# Patient Record
Sex: FEMALE | Race: OTHER | HISPANIC OR LATINO | URBAN - METROPOLITAN AREA
[De-identification: names, ages, dates, MRNs, and addresses within clinical notes are randomized per-mention and may not be internally consistent; named-entity substitution may affect disease eponyms.]

---

## 2017-08-18 ENCOUNTER — OUTPATIENT (OUTPATIENT)
Dept: OUTPATIENT SERVICES | Facility: HOSPITAL | Age: 82
LOS: 1 days | Discharge: HOME | End: 2017-08-18

## 2017-08-18 DIAGNOSIS — E08.9 DIABETES MELLITUS DUE TO UNDERLYING CONDITION WITHOUT COMPLICATIONS: ICD-10-CM

## 2017-08-18 DIAGNOSIS — K75.2 NONSPECIFIC REACTIVE HEPATITIS: ICD-10-CM

## 2017-08-18 DIAGNOSIS — I10 ESSENTIAL (PRIMARY) HYPERTENSION: ICD-10-CM

## 2017-08-19 ENCOUNTER — OUTPATIENT (OUTPATIENT)
Dept: OUTPATIENT SERVICES | Facility: HOSPITAL | Age: 82
LOS: 1 days | Discharge: HOME | End: 2017-08-19

## 2017-08-19 DIAGNOSIS — I10 ESSENTIAL (PRIMARY) HYPERTENSION: ICD-10-CM

## 2017-09-07 ENCOUNTER — OUTPATIENT (OUTPATIENT)
Dept: OUTPATIENT SERVICES | Facility: HOSPITAL | Age: 82
LOS: 1 days | Discharge: HOME | End: 2017-09-07

## 2017-09-07 DIAGNOSIS — I10 ESSENTIAL (PRIMARY) HYPERTENSION: ICD-10-CM

## 2018-09-28 ENCOUNTER — OUTPATIENT (OUTPATIENT)
Dept: OUTPATIENT SERVICES | Facility: HOSPITAL | Age: 83
LOS: 1 days | Discharge: HOME | End: 2018-09-28

## 2018-09-28 DIAGNOSIS — R19.7 DIARRHEA, UNSPECIFIED: ICD-10-CM

## 2018-10-01 ENCOUNTER — EMERGENCY (EMERGENCY)
Facility: HOSPITAL | Age: 83
LOS: 0 days | Discharge: HOME | End: 2018-10-01
Attending: EMERGENCY MEDICINE | Admitting: EMERGENCY MEDICINE

## 2018-10-01 VITALS
SYSTOLIC BLOOD PRESSURE: 106 MMHG | DIASTOLIC BLOOD PRESSURE: 74 MMHG | HEIGHT: 66 IN | RESPIRATION RATE: 18 BRPM | WEIGHT: 134.92 LBS | OXYGEN SATURATION: 96 % | TEMPERATURE: 98 F | HEART RATE: 95 BPM

## 2018-10-01 DIAGNOSIS — I10 ESSENTIAL (PRIMARY) HYPERTENSION: ICD-10-CM

## 2018-10-01 DIAGNOSIS — F03.90 UNSPECIFIED DEMENTIA WITHOUT BEHAVIORAL DISTURBANCE: ICD-10-CM

## 2018-10-01 DIAGNOSIS — Z79.4 LONG TERM (CURRENT) USE OF INSULIN: ICD-10-CM

## 2018-10-01 DIAGNOSIS — R45.1 RESTLESSNESS AND AGITATION: ICD-10-CM

## 2018-10-01 DIAGNOSIS — E11.9 TYPE 2 DIABETES MELLITUS WITHOUT COMPLICATIONS: ICD-10-CM

## 2018-10-01 DIAGNOSIS — Z79.899 OTHER LONG TERM (CURRENT) DRUG THERAPY: ICD-10-CM

## 2018-10-01 DIAGNOSIS — Z91.013 ALLERGY TO SEAFOOD: ICD-10-CM

## 2018-10-01 RX ORDER — ATORVASTATIN CALCIUM 80 MG/1
1 TABLET, FILM COATED ORAL
Qty: 0 | Refills: 0 | COMMUNITY

## 2018-10-01 RX ORDER — CARBIDOPA AND LEVODOPA 25; 100 MG/1; MG/1
1 TABLET ORAL
Qty: 0 | Refills: 0 | COMMUNITY

## 2018-10-01 RX ORDER — ACETAMINOPHEN 500 MG
2 TABLET ORAL
Qty: 0 | Refills: 0 | COMMUNITY

## 2018-10-01 RX ORDER — DONEPEZIL HYDROCHLORIDE 10 MG/1
1 TABLET, FILM COATED ORAL
Qty: 0 | Refills: 0 | COMMUNITY

## 2018-10-01 RX ORDER — QUETIAPINE FUMARATE 200 MG/1
1 TABLET, FILM COATED ORAL
Qty: 0 | Refills: 0 | COMMUNITY

## 2018-10-01 RX ORDER — BENAZEPRIL HYDROCHLORIDE 40 MG/1
1 TABLET ORAL
Qty: 0 | Refills: 0 | COMMUNITY

## 2018-10-01 RX ORDER — LEVETIRACETAM 250 MG/1
1 TABLET, FILM COATED ORAL
Qty: 0 | Refills: 0 | COMMUNITY

## 2018-10-01 RX ORDER — INSULIN GLARGINE 100 [IU]/ML
0 INJECTION, SOLUTION SUBCUTANEOUS
Qty: 0 | Refills: 0 | COMMUNITY

## 2018-10-01 RX ORDER — AMLODIPINE BESYLATE 2.5 MG/1
1 TABLET ORAL
Qty: 0 | Refills: 0 | COMMUNITY
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
## 2018-10-01 NOTE — ED ADULT NURSE NOTE - NSIMPLEMENTINTERV_GEN_ALL_ED
Implemented All Fall with Harm Risk Interventions:  [place] to call system. Call bell, personal items and telephone within reach. Instruct patient to call for assistance. Room bathroom lighting operational. Non-slip footwear when patient is off stretcher. Physically safe environment: no spills, clutter or unnecessary equipment. Stretcher in lowest position, wheels locked, appropriate side rails in place. Provide visual cue, wrist band, yellow gown, etc. Monitor gait and stability. Monitor for mental status changes and reorient to person, place, and time. Review medications for side effects contributing to fall risk. Reinforce activity limits and safety measures with patient and family. Provide visual clues: red socks.
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
## 2018-10-01 NOTE — ED PROVIDER NOTE - NS ED ROS FT
Review of Systems:  	•	CONSTITUTIONAL - no fever, no diaphoresis, no chills  	•	SKIN - no rash  	•	HEMATOLOGIC - no bleeding, no bruising  	•	EYES - no eye pain, no blurry vision  	•	ENT - no change in hearing, no sore throat, no ear pain or tinnitus  	•	RESPIRATORY - no shortness of breath, no cough  	•	CARDIAC - no chest pain, no palpitations  	•	GI - no abd pain, no nausea, no vomiting, no diarrhea, no constipation  	•	GENITO-URINARY - no discharge, no dysuria; no hematuria, no increased urinary frequency  	•	MUSCULOSKELETAL - no joint paint, no swelling, no redness  	•	NEUROLOGIC - no weakness, no headache, no paresthesias, no LOC  	•	PSYCH - no anxiety, non suicidal, non homicidal, no hallucination, no depression

## 2018-10-02 ENCOUNTER — EMERGENCY (EMERGENCY)
Facility: HOSPITAL | Age: 83
LOS: 0 days | Discharge: NURSING, REHAB OR LONG-TERM CARE | End: 2018-10-02
Attending: EMERGENCY MEDICINE | Admitting: EMERGENCY MEDICINE

## 2018-10-02 VITALS
RESPIRATION RATE: 19 BRPM | SYSTOLIC BLOOD PRESSURE: 123 MMHG | HEART RATE: 103 BPM | DIASTOLIC BLOOD PRESSURE: 61 MMHG | TEMPERATURE: 97 F | OXYGEN SATURATION: 98 %

## 2018-10-02 DIAGNOSIS — I10 ESSENTIAL (PRIMARY) HYPERTENSION: ICD-10-CM

## 2018-10-02 DIAGNOSIS — E11.9 TYPE 2 DIABETES MELLITUS WITHOUT COMPLICATIONS: ICD-10-CM

## 2018-10-02 DIAGNOSIS — Z79.4 LONG TERM (CURRENT) USE OF INSULIN: ICD-10-CM

## 2018-10-02 DIAGNOSIS — Z79.899 OTHER LONG TERM (CURRENT) DRUG THERAPY: ICD-10-CM

## 2018-10-02 DIAGNOSIS — Z91.013 ALLERGY TO SEAFOOD: ICD-10-CM

## 2018-10-02 DIAGNOSIS — R45.1 RESTLESSNESS AND AGITATION: ICD-10-CM

## 2018-10-02 DIAGNOSIS — F03.91 UNSPECIFIED DEMENTIA WITH BEHAVIORAL DISTURBANCE: ICD-10-CM

## 2018-10-02 RX ORDER — HALOPERIDOL DECANOATE 100 MG/ML
2.5 INJECTION INTRAMUSCULAR ONCE
Qty: 0 | Refills: 0 | Status: COMPLETED | OUTPATIENT
Start: 2018-10-02 | End: 2018-10-02

## 2018-10-02 RX ORDER — HALOPERIDOL DECANOATE 100 MG/ML
0.5 INJECTION INTRAMUSCULAR ONCE
Qty: 0 | Refills: 0 | Status: COMPLETED | OUTPATIENT
Start: 2018-10-02 | End: 2018-10-02

## 2018-10-02 RX ADMIN — HALOPERIDOL DECANOATE 2.5 MILLIGRAM(S): 100 INJECTION INTRAMUSCULAR at 15:14

## 2018-10-02 RX ADMIN — HALOPERIDOL DECANOATE 0.5 MILLIGRAM(S): 100 INJECTION INTRAMUSCULAR at 14:33
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
## 2018-10-02 NOTE — ED ADULT NURSE NOTE - NSIMPLEMENTINTERV_GEN_ALL_ED
Implemented All Fall with Harm Risk Interventions:  [place] to call system. Call bell, personal items and telephone within reach. Instruct patient to call for assistance. Room bathroom lighting operational. Non-slip footwear when patient is off stretcher. Physically safe environment: no spills, clutter or unnecessary equipment. Stretcher in lowest position, wheels locked, appropriate side rails in place. Provide visual cue, wrist band, yellow gown, etc. Monitor gait and stability. Monitor for mental status changes and reorient to person, place, and time. Review medications for side effects contributing to fall risk. Reinforce activity limits and safety measures with patient and family. Provide visual clues: red socks. Implemented All Fall Risk Interventions:  [place] to call system. Call bell, personal items and telephone within reach. Instruct patient to call for assistance. Room bathroom lighting operational. Non-slip footwear when patient is off stretcher. Physically safe environment: no spills, clutter or unnecessary equipment. Stretcher in lowest position, wheels locked, appropriate side rails in place. Provide visual cue, wrist band, yellow gown, etc. Monitor gait and stability. Monitor for mental status changes and reorient to person, place, and time. Review medications for side effects contributing to fall risk. Reinforce activity limits and safety measures with patient and family.

## 2018-10-02 NOTE — ED PROVIDER NOTE - ATTENDING CONTRIBUTION TO CARE
dementia with behavioral disturbance, afebrile and nonfocal in ED, yesterday's psych consult appreciated and recommendations communicated with NH staff, will medicate and discahrge
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
## 2018-10-02 NOTE — ED ADULT TRIAGE NOTE - NURSING HOMES
[facility] and [facility], [facility]
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
## 2018-10-03 ENCOUNTER — OUTPATIENT (OUTPATIENT)
Dept: OUTPATIENT SERVICES | Facility: HOSPITAL | Age: 83
LOS: 1 days | Discharge: HOME | End: 2018-10-03

## 2018-10-03 DIAGNOSIS — D64.9 ANEMIA, UNSPECIFIED: ICD-10-CM

## 2018-10-03 DIAGNOSIS — R94.5 ABNORMAL RESULTS OF LIVER FUNCTION STUDIES: ICD-10-CM

## 2018-10-03 PROBLEM — F03.90 UNSPECIFIED DEMENTIA WITHOUT BEHAVIORAL DISTURBANCE: Chronic | Status: ACTIVE | Noted: 2018-10-01

## 2018-10-03 PROBLEM — E11.9 TYPE 2 DIABETES MELLITUS WITHOUT COMPLICATIONS: Chronic | Status: ACTIVE | Noted: 2018-10-01

## 2018-10-03 PROBLEM — F03.90 UNSPECIFIED DEMENTIA, UNSPECIFIED SEVERITY, WITHOUT BEHAVIORAL DISTURBANCE, PSYCHOTIC DISTURBANCE, MOOD DISTURBANCE, AND ANXIETY: Chronic | Status: ACTIVE | Noted: 2018-10-01

## 2018-10-03 PROBLEM — I10 ESSENTIAL (PRIMARY) HYPERTENSION: Chronic | Status: ACTIVE | Noted: 2018-10-01

## 2018-10-03 NOTE — ED ADULT NURSE REASSESSMENT NOTE - NS ED NURSE REASSESS COMMENT FT1
[place] [facility] [phone].  Please contact pt's daughter for update on mom's status. no

## 2018-10-05 ENCOUNTER — EMERGENCY (EMERGENCY)
Facility: HOSPITAL | Age: 83
LOS: 0 days | Discharge: HOME | End: 2018-10-06
Attending: EMERGENCY MEDICINE | Admitting: EMERGENCY MEDICINE

## 2018-10-05 VITALS
HEART RATE: 92 BPM | SYSTOLIC BLOOD PRESSURE: 122 MMHG | TEMPERATURE: 98 F | RESPIRATION RATE: 20 BRPM | OXYGEN SATURATION: 95 % | DIASTOLIC BLOOD PRESSURE: 69 MMHG

## 2018-10-05 VITALS
HEART RATE: 108 BPM | OXYGEN SATURATION: 100 % | SYSTOLIC BLOOD PRESSURE: 136 MMHG | RESPIRATION RATE: 18 BRPM | DIASTOLIC BLOOD PRESSURE: 100 MMHG

## 2018-10-05 DIAGNOSIS — Z86.73 PERSONAL HISTORY OF TRANSIENT ISCHEMIC ATTACK (TIA), AND CEREBRAL INFARCTION WITHOUT RESIDUAL DEFICITS: ICD-10-CM

## 2018-10-05 DIAGNOSIS — G20 PARKINSON'S DISEASE: ICD-10-CM

## 2018-10-05 DIAGNOSIS — F02.81 DEMENTIA IN OTHER DISEASES CLASSIFIED ELSEWHERE, UNSPECIFIED SEVERITY, WITH BEHAVIORAL DISTURBANCE: ICD-10-CM

## 2018-10-05 DIAGNOSIS — E11.9 TYPE 2 DIABETES MELLITUS WITHOUT COMPLICATIONS: ICD-10-CM

## 2018-10-05 DIAGNOSIS — Z79.4 LONG TERM (CURRENT) USE OF INSULIN: ICD-10-CM

## 2018-10-05 DIAGNOSIS — N39.0 URINARY TRACT INFECTION, SITE NOT SPECIFIED: ICD-10-CM

## 2018-10-05 DIAGNOSIS — I10 ESSENTIAL (PRIMARY) HYPERTENSION: ICD-10-CM

## 2018-10-05 DIAGNOSIS — G30.9 ALZHEIMER'S DISEASE, UNSPECIFIED: ICD-10-CM

## 2018-10-05 DIAGNOSIS — R69 ILLNESS, UNSPECIFIED: ICD-10-CM

## 2018-10-05 DIAGNOSIS — R45.1 RESTLESSNESS AND AGITATION: ICD-10-CM

## 2018-10-05 DIAGNOSIS — Z79.899 OTHER LONG TERM (CURRENT) DRUG THERAPY: ICD-10-CM

## 2018-10-05 DIAGNOSIS — Z91.013 ALLERGY TO SEAFOOD: ICD-10-CM

## 2018-10-05 LAB
ALBUMIN SERPL ELPH-MCNC: 4.2 G/DL — SIGNIFICANT CHANGE UP (ref 3.5–5.2)
ALP SERPL-CCNC: 119 U/L — HIGH (ref 30–115)
ALT FLD-CCNC: 20 U/L — SIGNIFICANT CHANGE UP (ref 0–41)
ANION GAP SERPL CALC-SCNC: 18 MMOL/L — HIGH (ref 7–14)
AST SERPL-CCNC: 31 U/L — SIGNIFICANT CHANGE UP (ref 0–41)
BASE EXCESS BLDV CALC-SCNC: 4.3 MMOL/L — HIGH (ref -2–2)
BASOPHILS # BLD AUTO: 0.05 K/UL — SIGNIFICANT CHANGE UP (ref 0–0.2)
BASOPHILS NFR BLD AUTO: 0.4 % — SIGNIFICANT CHANGE UP (ref 0–1)
BILIRUB SERPL-MCNC: 0.3 MG/DL — SIGNIFICANT CHANGE UP (ref 0.2–1.2)
BUN SERPL-MCNC: 25 MG/DL — HIGH (ref 10–20)
CA-I SERPL-SCNC: 1.24 MMOL/L — SIGNIFICANT CHANGE UP (ref 1.12–1.3)
CALCIUM SERPL-MCNC: 10 MG/DL — SIGNIFICANT CHANGE UP (ref 8.5–10.1)
CHLORIDE SERPL-SCNC: 95 MMOL/L — LOW (ref 98–110)
CO2 SERPL-SCNC: 24 MMOL/L — SIGNIFICANT CHANGE UP (ref 17–32)
CREAT SERPL-MCNC: 0.9 MG/DL — SIGNIFICANT CHANGE UP (ref 0.7–1.5)
EOSINOPHIL # BLD AUTO: 0.08 K/UL — SIGNIFICANT CHANGE UP (ref 0–0.7)
EOSINOPHIL NFR BLD AUTO: 0.6 % — SIGNIFICANT CHANGE UP (ref 0–8)
GAS PNL BLDV: 137 MMOL/L — SIGNIFICANT CHANGE UP (ref 136–145)
GAS PNL BLDV: SIGNIFICANT CHANGE UP
GLUCOSE SERPL-MCNC: 117 MG/DL — HIGH (ref 70–99)
HCO3 BLDV-SCNC: 30 MMOL/L — HIGH (ref 22–29)
HCT VFR BLD CALC: 40.5 % — SIGNIFICANT CHANGE UP (ref 37–47)
HCT VFR BLDA CALC: 42.7 % — SIGNIFICANT CHANGE UP (ref 34–44)
HGB BLD CALC-MCNC: 13.9 G/DL — LOW (ref 14–18)
HGB BLD-MCNC: 13.3 G/DL — SIGNIFICANT CHANGE UP (ref 12–16)
IMM GRANULOCYTES NFR BLD AUTO: 0.6 % — HIGH (ref 0.1–0.3)
LACTATE BLDV-MCNC: 1.4 MMOL/L — SIGNIFICANT CHANGE UP (ref 0.5–1.6)
LYMPHOCYTES # BLD AUTO: 30.9 % — SIGNIFICANT CHANGE UP (ref 20.5–51.1)
LYMPHOCYTES # BLD AUTO: 4.12 K/UL — HIGH (ref 1.2–3.4)
MCHC RBC-ENTMCNC: 27.1 PG — SIGNIFICANT CHANGE UP (ref 27–31)
MCHC RBC-ENTMCNC: 32.8 G/DL — SIGNIFICANT CHANGE UP (ref 32–37)
MCV RBC AUTO: 82.7 FL — SIGNIFICANT CHANGE UP (ref 81–99)
MONOCYTES # BLD AUTO: 1 K/UL — HIGH (ref 0.1–0.6)
MONOCYTES NFR BLD AUTO: 7.5 % — SIGNIFICANT CHANGE UP (ref 1.7–9.3)
NEUTROPHILS # BLD AUTO: 8.02 K/UL — HIGH (ref 1.4–6.5)
NEUTROPHILS NFR BLD AUTO: 60 % — SIGNIFICANT CHANGE UP (ref 42.2–75.2)
NRBC # BLD: 0 /100 WBCS — SIGNIFICANT CHANGE UP (ref 0–0)
PCO2 BLDV: 50 MMHG — SIGNIFICANT CHANGE UP (ref 41–51)
PH BLDV: 7.39 — SIGNIFICANT CHANGE UP (ref 7.26–7.43)
PLATELET # BLD AUTO: 251 K/UL — SIGNIFICANT CHANGE UP (ref 130–400)
PO2 BLDV: 44 MMHG — HIGH (ref 20–40)
POTASSIUM BLDV-SCNC: 5 MMOL/L — SIGNIFICANT CHANGE UP (ref 3.3–5.6)
POTASSIUM SERPL-MCNC: 5.1 MMOL/L — HIGH (ref 3.5–5)
POTASSIUM SERPL-SCNC: 5.1 MMOL/L — HIGH (ref 3.5–5)
PROT SERPL-MCNC: 7.3 G/DL — SIGNIFICANT CHANGE UP (ref 6–8)
RBC # BLD: 4.9 M/UL — SIGNIFICANT CHANGE UP (ref 4.2–5.4)
RBC # FLD: 13 % — SIGNIFICANT CHANGE UP (ref 11.5–14.5)
SAO2 % BLDV: 74 % — SIGNIFICANT CHANGE UP
SODIUM SERPL-SCNC: 137 MMOL/L — SIGNIFICANT CHANGE UP (ref 135–146)
WBC # BLD: 13.35 K/UL — HIGH (ref 4.8–10.8)
WBC # FLD AUTO: 13.35 K/UL — HIGH (ref 4.8–10.8)

## 2018-10-05 RX ORDER — MIDAZOLAM HYDROCHLORIDE 1 MG/ML
2 INJECTION, SOLUTION INTRAMUSCULAR; INTRAVENOUS ONCE
Qty: 0 | Refills: 0 | Status: DISCONTINUED | OUTPATIENT
Start: 2018-10-05 | End: 2018-10-05

## 2018-10-05 RX ORDER — MIDAZOLAM HYDROCHLORIDE 1 MG/ML
1 INJECTION, SOLUTION INTRAMUSCULAR; INTRAVENOUS ONCE
Qty: 0 | Refills: 0 | Status: DISCONTINUED | OUTPATIENT
Start: 2018-10-05 | End: 2018-10-05

## 2018-10-05 RX ADMIN — MIDAZOLAM HYDROCHLORIDE 2 MILLIGRAM(S): 1 INJECTION, SOLUTION INTRAMUSCULAR; INTRAVENOUS at 23:38

## 2018-10-05 RX ADMIN — MIDAZOLAM HYDROCHLORIDE 1 MILLIGRAM(S): 1 INJECTION, SOLUTION INTRAMUSCULAR; INTRAVENOUS at 20:35

## 2018-10-05 NOTE — ED PROVIDER NOTE - CARE PLAN
Principal Discharge DX:	Urinary tract infection without hematuria, site unspecified  Secondary Diagnosis:	Alzheimer's dementia with behavioral disturbance, unspecified timing of dementia onset
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
## 2018-10-05 NOTE — ED PROVIDER NOTE - PHYSICAL EXAMINATION
VITAL SIGNS: I have reviewed nursing notes and confirm.  CONSTITUTIONAL: Well-developed; well-nourished; in no acute distress.  SKIN: Skin exam is warm and dry, no acute rash.  HEAD: Normocephalic; atraumatic. No facial droop  EYES: PERRL, EOM intact; conjunctiva and sclera clear.  ENT: No nasal discharge; airway clear. TMs clear.  NECK: Supple; non tender. No C-spine tenderness  CARD: S1, S2 normal; no murmurs, gallops, or rubs. Regular rate and rhythm.  RESP: No wheezes, rales or rhonchi.  ABD: Normal bowel sounds; soft; non-distended; non-tender; no hepatosplenomegaly.  EXT: Normal ROM. No clubbing, cyanosis or edema.  LYMPH: No acute cervical adenopathy.  NEURO: Alert, oriented. Grossly unremarkable. No focal deficits.  PSYCH: not answering questions, or responds with random responses, even with re-direction, +dementia at baseline.
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
## 2018-10-05 NOTE — ED ADULT NURSE NOTE - NSIMPLEMENTINTERV_GEN_ALL_ED
Implemented All Fall with Harm Risk Interventions:  [place] to call system. Call bell, personal items and telephone within reach. Instruct patient to call for assistance. Room bathroom lighting operational. Non-slip footwear when patient is off stretcher. Physically safe environment: no spills, clutter or unnecessary equipment. Stretcher in lowest position, wheels locked, appropriate side rails in place. Provide visual cue, wrist band, yellow gown, etc. Monitor gait and stability. Monitor for mental status changes and reorient to person, place, and time. Review medications for side effects contributing to fall risk. Reinforce activity limits and safety measures with patient and family. Provide visual clues: red socks.

## 2018-10-05 NOTE — ED ADULT NURSE NOTE - NS PRO AD BILL OF RIGHTS
LM to Mother-  Is she talking about meningitis vaccine? Regardless Pt has appt tomorrow w/ [facility], can discuss vaccines at visit. Yes

## 2018-10-05 NOTE — ED PROVIDER NOTE - MEDICAL DECISION MAKING DETAILS
elderly female from [facility] with dementia (advanced) and agitation. Sent in for eval. Cleared for acute psych issues from attending psych and has +UTI. Will send back with prescription for abx and to follow up with her PMD

## 2018-10-05 NOTE — ED PROVIDER NOTE - PROGRESS NOTE DETAILS
Dr Parikh from psych spoke to NH staff--pt can go back as long as pt medically cleared. She is clearing from psych standpoint. spoke to NH again. Informed them about +UTI on results. Given IV abx in ER and sent with lab result and physical paper prescription as requested by the RN at NH
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
## 2018-10-06 LAB
APPEARANCE UR: ABNORMAL
BACTERIA # UR AUTO: ABNORMAL /HPF
BILIRUB UR-MCNC: NEGATIVE — SIGNIFICANT CHANGE UP
COLOR SPEC: YELLOW — SIGNIFICANT CHANGE UP
DIFF PNL FLD: NEGATIVE — SIGNIFICANT CHANGE UP
GLUCOSE UR QL: NEGATIVE MG/DL — SIGNIFICANT CHANGE UP
KETONES UR-MCNC: NEGATIVE — SIGNIFICANT CHANGE UP
LEUKOCYTE ESTERASE UR-ACNC: ABNORMAL
NITRITE UR-MCNC: POSITIVE
PH UR: 6.5 — SIGNIFICANT CHANGE UP (ref 5–8)
PROT UR-MCNC: NEGATIVE MG/DL — SIGNIFICANT CHANGE UP
SP GR SPEC: 1.01 — SIGNIFICANT CHANGE UP (ref 1.01–1.03)
UROBILINOGEN FLD QL: 0.2 MG/DL — SIGNIFICANT CHANGE UP (ref 0.2–0.2)
WBC UR QL: ABNORMAL /HPF

## 2018-10-06 RX ORDER — CEFTRIAXONE 500 MG/1
1 INJECTION, POWDER, FOR SOLUTION INTRAMUSCULAR; INTRAVENOUS ONCE
Qty: 0 | Refills: 0 | Status: COMPLETED | OUTPATIENT
Start: 2018-10-06 | End: 2018-10-06

## 2018-10-06 RX ORDER — CEFDINIR 250 MG/5ML
1 POWDER, FOR SUSPENSION ORAL
Qty: 14 | Refills: 0 | OUTPATIENT
Start: 2018-10-06 | End: 2018-10-12

## 2018-10-06 RX ADMIN — CEFTRIAXONE 100 GRAM(S): 500 INJECTION, POWDER, FOR SOLUTION INTRAMUSCULAR; INTRAVENOUS at 00:32

## 2018-10-15 ENCOUNTER — EMERGENCY (EMERGENCY)
Facility: HOSPITAL | Age: 83
LOS: 0 days | Discharge: NURSING, REHAB OR LONG-TERM CARE | End: 2018-10-15
Attending: EMERGENCY MEDICINE | Admitting: EMERGENCY MEDICINE

## 2018-10-15 VITALS
TEMPERATURE: 97 F | SYSTOLIC BLOOD PRESSURE: 136 MMHG | HEART RATE: 73 BPM | OXYGEN SATURATION: 98 % | DIASTOLIC BLOOD PRESSURE: 67 MMHG | RESPIRATION RATE: 16 BRPM

## 2018-10-15 DIAGNOSIS — Z91.013 ALLERGY TO SEAFOOD: ICD-10-CM

## 2018-10-15 DIAGNOSIS — Z79.811 LONG TERM (CURRENT) USE OF AROMATASE INHIBITORS: ICD-10-CM

## 2018-10-15 DIAGNOSIS — I10 ESSENTIAL (PRIMARY) HYPERTENSION: ICD-10-CM

## 2018-10-15 DIAGNOSIS — Z79.4 LONG TERM (CURRENT) USE OF INSULIN: ICD-10-CM

## 2018-10-15 DIAGNOSIS — E11.9 TYPE 2 DIABETES MELLITUS WITHOUT COMPLICATIONS: ICD-10-CM

## 2018-10-15 DIAGNOSIS — Z79.899 OTHER LONG TERM (CURRENT) DRUG THERAPY: ICD-10-CM

## 2018-10-15 DIAGNOSIS — F91.9 CONDUCT DISORDER, UNSPECIFIED: ICD-10-CM

## 2018-10-15 DIAGNOSIS — Z79.2 LONG TERM (CURRENT) USE OF ANTIBIOTICS: ICD-10-CM

## 2018-10-15 DIAGNOSIS — F03.90 UNSPECIFIED DEMENTIA WITHOUT BEHAVIORAL DISTURBANCE: ICD-10-CM

## 2018-10-15 DIAGNOSIS — Z79.891 LONG TERM (CURRENT) USE OF OPIATE ANALGESIC: ICD-10-CM

## 2018-10-15 DIAGNOSIS — Z79.01 LONG TERM (CURRENT) USE OF ANTICOAGULANTS: ICD-10-CM
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
## 2018-10-15 NOTE — ED PROVIDER NOTE - PHYSICAL EXAMINATION
GEN: Well appearing, in no apparent distress.    HEAD:  Normocephalic, atraumatic.    EYES:  Clear conjunctivae without injection, drainage or discharge.    ENMT:  Moist MM.    NECK:  Supple, no masses. Normal ROM.    CARDIAC:  RRR, normal S1 and S2, no murmurs, rubs or gallops.    RESP:  Respiratory rate and effort appear normal; lungs are clear to auscultation bilaterally; no rhonchi, rales or wheezes.    ABDOMEN:  Soft, non-tender, non-distended, no masses. Normal BS throughout.    MUSCULOSKELETAL: No leg swelling, no calf tenderness. Patient able to ambulate without difficulty.  NEURO:  Alert and awake, oriented x 1 (self) at baseline.     SKIN:  Normal skin color for age and race, well-perfused; warm and dry.

## 2018-10-16 PROBLEM — F20.9 SCHIZOPHRENIA, UNSPECIFIED: Chronic | Status: ACTIVE | Noted: 2018-10-05

## 2018-11-08 ENCOUNTER — OUTPATIENT (OUTPATIENT)
Dept: OUTPATIENT SERVICES | Facility: HOSPITAL | Age: 83
LOS: 1 days | Discharge: HOME | End: 2018-11-08

## 2018-11-09 DIAGNOSIS — R79.9 ABNORMAL FINDING OF BLOOD CHEMISTRY, UNSPECIFIED: ICD-10-CM

## 2018-11-12 ENCOUNTER — OUTPATIENT (OUTPATIENT)
Dept: OUTPATIENT SERVICES | Facility: HOSPITAL | Age: 83
LOS: 1 days | Discharge: HOME | End: 2018-11-12

## 2018-11-12 DIAGNOSIS — R79.9 ABNORMAL FINDING OF BLOOD CHEMISTRY, UNSPECIFIED: ICD-10-CM

## 2018-12-12 ENCOUNTER — OUTPATIENT (OUTPATIENT)
Dept: OUTPATIENT SERVICES | Facility: HOSPITAL | Age: 83
LOS: 1 days | Discharge: HOME | End: 2018-12-12

## 2018-12-12 DIAGNOSIS — E10.9 TYPE 1 DIABETES MELLITUS WITHOUT COMPLICATIONS: ICD-10-CM

## 2019-01-08 ENCOUNTER — OUTPATIENT (OUTPATIENT)
Dept: OUTPATIENT SERVICES | Facility: HOSPITAL | Age: 84
LOS: 1 days | Discharge: HOME | End: 2019-01-08

## 2019-01-08 DIAGNOSIS — I10 ESSENTIAL (PRIMARY) HYPERTENSION: ICD-10-CM

## 2019-02-05 ENCOUNTER — OUTPATIENT (OUTPATIENT)
Dept: OUTPATIENT SERVICES | Facility: HOSPITAL | Age: 84
LOS: 1 days | Discharge: HOME | End: 2019-02-05

## 2019-02-05 DIAGNOSIS — R94.5 ABNORMAL RESULTS OF LIVER FUNCTION STUDIES: ICD-10-CM

## 2019-02-06 ENCOUNTER — OUTPATIENT (OUTPATIENT)
Dept: OUTPATIENT SERVICES | Facility: HOSPITAL | Age: 84
LOS: 1 days | Discharge: HOME | End: 2019-02-06

## 2019-02-06 DIAGNOSIS — E11.9 TYPE 2 DIABETES MELLITUS WITHOUT COMPLICATIONS: ICD-10-CM

## 2019-02-12 ENCOUNTER — OUTPATIENT (OUTPATIENT)
Dept: OUTPATIENT SERVICES | Facility: HOSPITAL | Age: 84
LOS: 1 days | Discharge: HOME | End: 2019-02-12

## 2019-02-28 ENCOUNTER — OUTPATIENT (OUTPATIENT)
Dept: OUTPATIENT SERVICES | Facility: HOSPITAL | Age: 84
LOS: 1 days | Discharge: HOME | End: 2019-02-28

## 2019-02-28 DIAGNOSIS — N39.0 URINARY TRACT INFECTION, SITE NOT SPECIFIED: ICD-10-CM

## 2019-02-28 DIAGNOSIS — E11.29 TYPE 2 DIABETES MELLITUS WITH OTHER DIABETIC KIDNEY COMPLICATION: ICD-10-CM

## 2019-04-16 ENCOUNTER — OUTPATIENT (OUTPATIENT)
Dept: OUTPATIENT SERVICES | Facility: HOSPITAL | Age: 84
LOS: 1 days | Discharge: HOME | End: 2019-04-16

## 2019-04-16 DIAGNOSIS — R79.89 OTHER SPECIFIED ABNORMAL FINDINGS OF BLOOD CHEMISTRY: ICD-10-CM

## 2019-08-23 ENCOUNTER — OUTPATIENT (OUTPATIENT)
Dept: OUTPATIENT SERVICES | Facility: HOSPITAL | Age: 84
LOS: 1 days | Discharge: HOME | End: 2019-08-23

## 2019-08-23 DIAGNOSIS — D64.9 ANEMIA, UNSPECIFIED: ICD-10-CM

## 2019-08-25 ENCOUNTER — OUTPATIENT (OUTPATIENT)
Dept: OUTPATIENT SERVICES | Facility: HOSPITAL | Age: 84
LOS: 1 days | Discharge: HOME | End: 2019-08-25

## 2019-08-26 DIAGNOSIS — N39.0 URINARY TRACT INFECTION, SITE NOT SPECIFIED: ICD-10-CM

## 2019-08-26 DIAGNOSIS — R53.83 OTHER FATIGUE: ICD-10-CM

## 2019-09-09 NOTE — ED ADULT TRIAGE NOTE - ESI TRIAGE ACUITY LEVEL, MLM
Problem: Device-Related Complication Risk (Hemodialysis)  Goal: Safe, Effective Therapy Delivery  Outcome: Ongoing (interventions implemented as appropriate)  Intervention: Optimize Device Care and Function     09/09/19 1147   Optimize Blood Flow   Circuit Management air detection alarms on;circuit line warming device in use;therapy discontinued;tubing/circuit/filter adjusted   Manage Acute Allergic Reaction   Medication Review/Management medications reviewed;high risk medications identified         Comments: 4 hour hd tx in progress as ordered.    4

## 2019-09-23 ENCOUNTER — OUTPATIENT (OUTPATIENT)
Dept: OUTPATIENT SERVICES | Facility: HOSPITAL | Age: 84
LOS: 1 days | Discharge: HOME | End: 2019-09-23

## 2019-09-23 DIAGNOSIS — A09 INFECTIOUS GASTROENTERITIS AND COLITIS, UNSPECIFIED: ICD-10-CM

## 2019-09-23 DIAGNOSIS — R79.9 ABNORMAL FINDING OF BLOOD CHEMISTRY, UNSPECIFIED: ICD-10-CM
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
## 2021-07-02 ENCOUNTER — EMERGENCY (EMERGENCY)
Facility: HOSPITAL | Age: 86
LOS: 0 days | Discharge: HOME | End: 2021-07-02
Attending: STUDENT IN AN ORGANIZED HEALTH CARE EDUCATION/TRAINING PROGRAM | Admitting: STUDENT IN AN ORGANIZED HEALTH CARE EDUCATION/TRAINING PROGRAM
Payer: MEDICARE

## 2021-07-02 VITALS
DIASTOLIC BLOOD PRESSURE: 68 MMHG | RESPIRATION RATE: 20 BRPM | OXYGEN SATURATION: 98 % | HEIGHT: 66 IN | SYSTOLIC BLOOD PRESSURE: 131 MMHG | HEART RATE: 79 BPM | TEMPERATURE: 97 F

## 2021-07-02 VITALS — WEIGHT: 126.99 LBS

## 2021-07-02 DIAGNOSIS — Z86.59 PERSONAL HISTORY OF OTHER MENTAL AND BEHAVIORAL DISORDERS: ICD-10-CM
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
## 2021-07-02 DIAGNOSIS — N30.00 ACUTE CYSTITIS WITHOUT HEMATURIA: ICD-10-CM

## 2021-07-02 DIAGNOSIS — S01.21XA LACERATION WITHOUT FOREIGN BODY OF NOSE, INITIAL ENCOUNTER: ICD-10-CM

## 2021-07-02 DIAGNOSIS — E78.5 HYPERLIPIDEMIA, UNSPECIFIED: ICD-10-CM

## 2021-07-02 DIAGNOSIS — Z79.899 OTHER LONG TERM (CURRENT) DRUG THERAPY: ICD-10-CM

## 2021-07-02 DIAGNOSIS — Z91.013 ALLERGY TO SEAFOOD: ICD-10-CM
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
## 2021-07-02 DIAGNOSIS — F03.90 UNSPECIFIED DEMENTIA, UNSPECIFIED SEVERITY, WITHOUT BEHAVIORAL DISTURBANCE, PSYCHOTIC DISTURBANCE, MOOD DISTURBANCE, AND ANXIETY: ICD-10-CM

## 2021-07-02 DIAGNOSIS — E11.9 TYPE 2 DIABETES MELLITUS WITHOUT COMPLICATIONS: ICD-10-CM

## 2021-07-02 DIAGNOSIS — S00.83XA CONTUSION OF OTHER PART OF HEAD, INITIAL ENCOUNTER: ICD-10-CM

## 2021-07-02 DIAGNOSIS — Z87.448 PERSONAL HISTORY OF OTHER DISEASES OF URINARY SYSTEM: ICD-10-CM

## 2021-07-02 DIAGNOSIS — S02.2XXA FRACTURE OF NASAL BONES, INITIAL ENCOUNTER FOR CLOSED FRACTURE: ICD-10-CM

## 2021-07-02 DIAGNOSIS — I10 ESSENTIAL (PRIMARY) HYPERTENSION: ICD-10-CM

## 2021-07-02 DIAGNOSIS — Z86.69 PERSONAL HISTORY OF OTHER DISEASES OF THE NERVOUS SYSTEM AND SENSE ORGANS: ICD-10-CM

## 2021-07-02 DIAGNOSIS — Z87.39 PERSONAL HISTORY OF OTHER DISEASES OF THE MUSCULOSKELETAL SYSTEM AND CONNECTIVE TISSUE: ICD-10-CM

## 2021-07-02 LAB
ALBUMIN SERPL ELPH-MCNC: 4.5 G/DL — SIGNIFICANT CHANGE UP (ref 3.5–5.2)
ALP SERPL-CCNC: 109 U/L — SIGNIFICANT CHANGE UP (ref 30–115)
ALT FLD-CCNC: 6 U/L — SIGNIFICANT CHANGE UP (ref 0–41)
ANION GAP SERPL CALC-SCNC: 9 MMOL/L — SIGNIFICANT CHANGE UP (ref 7–14)
APPEARANCE UR: CLEAR — SIGNIFICANT CHANGE UP
AST SERPL-CCNC: 18 U/L — SIGNIFICANT CHANGE UP (ref 0–41)
BACTERIA # UR AUTO: ABNORMAL
BASOPHILS # BLD AUTO: 0.02 K/UL — SIGNIFICANT CHANGE UP (ref 0–0.2)
BASOPHILS NFR BLD AUTO: 0.2 % — SIGNIFICANT CHANGE UP (ref 0–1)
BILIRUB SERPL-MCNC: 0.3 MG/DL — SIGNIFICANT CHANGE UP (ref 0.2–1.2)
BILIRUB UR-MCNC: NEGATIVE — SIGNIFICANT CHANGE UP
BUN SERPL-MCNC: 19 MG/DL — SIGNIFICANT CHANGE UP (ref 10–20)
CALCIUM SERPL-MCNC: 10.2 MG/DL — HIGH (ref 8.5–10.1)
CHLORIDE SERPL-SCNC: 102 MMOL/L — SIGNIFICANT CHANGE UP (ref 98–110)
CO2 SERPL-SCNC: 29 MMOL/L — SIGNIFICANT CHANGE UP (ref 17–32)
COLOR SPEC: SIGNIFICANT CHANGE UP
CREAT SERPL-MCNC: 0.6 MG/DL — LOW (ref 0.7–1.5)
DIFF PNL FLD: NEGATIVE — SIGNIFICANT CHANGE UP
EOSINOPHIL # BLD AUTO: 0.09 K/UL — SIGNIFICANT CHANGE UP (ref 0–0.7)
EOSINOPHIL NFR BLD AUTO: 0.8 % — SIGNIFICANT CHANGE UP (ref 0–8)
EPI CELLS # UR: 1 /HPF — SIGNIFICANT CHANGE UP (ref 0–5)
GLUCOSE SERPL-MCNC: 117 MG/DL — HIGH (ref 70–99)
GLUCOSE UR QL: NEGATIVE — SIGNIFICANT CHANGE UP
HCT VFR BLD CALC: 39.9 % — SIGNIFICANT CHANGE UP (ref 37–47)
HGB BLD-MCNC: 13.1 G/DL — SIGNIFICANT CHANGE UP (ref 12–16)
HYALINE CASTS # UR AUTO: 0 /LPF — SIGNIFICANT CHANGE UP (ref 0–7)
IMM GRANULOCYTES NFR BLD AUTO: 0.4 % — HIGH (ref 0.1–0.3)
KETONES UR-MCNC: NEGATIVE — SIGNIFICANT CHANGE UP
LACTATE SERPL-SCNC: 1 MMOL/L — SIGNIFICANT CHANGE UP (ref 0.7–2)
LEUKOCYTE ESTERASE UR-ACNC: NEGATIVE — SIGNIFICANT CHANGE UP
LIDOCAIN IGE QN: 40 U/L — SIGNIFICANT CHANGE UP (ref 7–60)
LYMPHOCYTES # BLD AUTO: 2.87 K/UL — SIGNIFICANT CHANGE UP (ref 1.2–3.4)
LYMPHOCYTES # BLD AUTO: 25.6 % — SIGNIFICANT CHANGE UP (ref 20.5–51.1)
MCHC RBC-ENTMCNC: 28.7 PG — SIGNIFICANT CHANGE UP (ref 27–31)
MCHC RBC-ENTMCNC: 32.8 G/DL — SIGNIFICANT CHANGE UP (ref 32–37)
MCV RBC AUTO: 87.3 FL — SIGNIFICANT CHANGE UP (ref 81–99)
MONOCYTES # BLD AUTO: 0.73 K/UL — HIGH (ref 0.1–0.6)
MONOCYTES NFR BLD AUTO: 6.5 % — SIGNIFICANT CHANGE UP (ref 1.7–9.3)
NEUTROPHILS # BLD AUTO: 7.44 K/UL — HIGH (ref 1.4–6.5)
NEUTROPHILS NFR BLD AUTO: 66.5 % — SIGNIFICANT CHANGE UP (ref 42.2–75.2)
NITRITE UR-MCNC: POSITIVE
NRBC # BLD: 0 /100 WBCS — SIGNIFICANT CHANGE UP (ref 0–0)
PH UR: 6.5 — SIGNIFICANT CHANGE UP (ref 5–8)
PLATELET # BLD AUTO: 243 K/UL — SIGNIFICANT CHANGE UP (ref 130–400)
POTASSIUM SERPL-MCNC: 4.2 MMOL/L — SIGNIFICANT CHANGE UP (ref 3.5–5)
POTASSIUM SERPL-SCNC: 4.2 MMOL/L — SIGNIFICANT CHANGE UP (ref 3.5–5)
PROT SERPL-MCNC: 7.4 G/DL — SIGNIFICANT CHANGE UP (ref 6–8)
PROT UR-MCNC: NEGATIVE — SIGNIFICANT CHANGE UP
RBC # BLD: 4.57 M/UL — SIGNIFICANT CHANGE UP (ref 4.2–5.4)
RBC # FLD: 12.3 % — SIGNIFICANT CHANGE UP (ref 11.5–14.5)
RBC CASTS # UR COMP ASSIST: 0 /HPF — SIGNIFICANT CHANGE UP (ref 0–4)
SODIUM SERPL-SCNC: 140 MMOL/L — SIGNIFICANT CHANGE UP (ref 135–146)
SP GR SPEC: 1.01 — SIGNIFICANT CHANGE UP (ref 1.01–1.03)
UROBILINOGEN FLD QL: SIGNIFICANT CHANGE UP
WBC # BLD: 11.19 K/UL — HIGH (ref 4.8–10.8)
WBC # FLD AUTO: 11.19 K/UL — HIGH (ref 4.8–10.8)
WBC UR QL: 2 /HPF — SIGNIFICANT CHANGE UP (ref 0–5)

## 2021-07-02 PROCEDURE — 71045 X-RAY EXAM CHEST 1 VIEW: CPT | Mod: 26

## 2021-07-02 PROCEDURE — 72125 CT NECK SPINE W/O DYE: CPT | Mod: 26,MA

## 2021-07-02 PROCEDURE — 93010 ELECTROCARDIOGRAM REPORT: CPT

## 2021-07-02 PROCEDURE — 99285 EMERGENCY DEPT VISIT HI MDM: CPT

## 2021-07-02 PROCEDURE — 70450 CT HEAD/BRAIN W/O DYE: CPT | Mod: 26,MA

## 2021-07-02 PROCEDURE — 72170 X-RAY EXAM OF PELVIS: CPT | Mod: 26

## 2021-07-02 PROCEDURE — 70486 CT MAXILLOFACIAL W/O DYE: CPT | Mod: 26,QQ

## 2021-07-02 RX ORDER — CEFTRIAXONE 500 MG/1
1000 INJECTION, POWDER, FOR SOLUTION INTRAMUSCULAR; INTRAVENOUS ONCE
Refills: 0 | Status: COMPLETED | OUTPATIENT
Start: 2021-07-02 | End: 2021-07-02

## 2021-07-02 RX ADMIN — CEFTRIAXONE 100 MILLIGRAM(S): 500 INJECTION, POWDER, FOR SOLUTION INTRAMUSCULAR; INTRAVENOUS at 18:51

## 2021-07-02 NOTE — ED ADULT NURSE NOTE - NS ED NRS NURSING HOMES
[facility] and [facility], [facility]
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
## 2021-07-02 NOTE — ED PROVIDER NOTE - PHYSICAL EXAMINATION
GENERAL: Well-nourished, Well-developed. NAD.  HEAD: No visible or palpable bumps or hematomas. No ecchymosis behind ears B/L.  Eyes: PERRLA, EOMI. No asymmetry. No nystagmus. No conjunctival injection. Non-icteric sclera.  ENMT: MMM.   Neck: Supple. No cervical midline TTP. No paravertebral TTP to traps. FROM  CVS: RRR. Normal S1,S2. No murmurs appreciated on auscultation   RESP: No use of accessory muscles. Chest rise symmetrical with good expansion. Lungs clear to auscultation B/L. No wheezing, rales, or rhonchi auscultated.  GI: Normal auscultation of bowel sounds in all 4 quadrants. Soft, Nontender, Nondistended. No guarding or rebound tenderness. No CVAT B/L.  MSK: Extremities w/o deformity or ttp. No visible signs of trauma such as ecchymosis, erythema, or swelling. FROM of upper and lower extremities B/L. No midline spinal TTP. FROM of back with flexion and extension.  Skin: small superficial laceration to bridge of nose, no active bleeding.  EXT: Radial and pedal pulses present B/L. No calf tenderness or swelling B/L. No palpable cords. No pedal edema B/L.  Neuro: awake alert, moving all extremities.

## 2021-07-02 NOTE — ED ADULT NURSE NOTE - NSIMPLEMENTINTERV_GEN_ALL_ED
Implemented All Fall with Harm Risk Interventions:  [place] to call system. Call bell, personal items and telephone within reach. Instruct patient to call for assistance. Room bathroom lighting operational. Non-slip footwear when patient is off stretcher. Physically safe environment: no spills, clutter or unnecessary equipment. Stretcher in lowest position, wheels locked, appropriate side rails in place. Provide visual cue, wrist band, yellow gown, etc. Monitor gait and stability. Monitor for mental status changes and reorient to person, place, and time. Review medications for side effects contributing to fall risk. Reinforce activity limits and safety measures with patient and family. Provide visual clues: red socks.
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
## 2021-07-02 NOTE — ED PROVIDER NOTE - OBJECTIVE STATEMENT
85 yo F pmhx Parkinson's, dementia, HTN, HLD, UTI, seizures, anxiety, DM sent to the ED from [facility] for evaluation of fall today at 145pm. As per NH pt had unwitnessed fall, was found on floor in her room. 87 yo F pmhx Parkinson's, dementia, HTN, HLD, UTI, seizures, anxiety, DM sent to the ED from [facility] for evaluation of fall today at 145pm. As per NH pt had unwitnessed fall, was found on floor in her room, unknown LOC, unknown head trauma. Pt is altered at baseline, no change in baseline. Pt not on any anticoags. Unable to obtain comprehensive ros due to dementia.
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
## 2021-07-02 NOTE — ED PROVIDER NOTE - NSFOLLOWUPINSTRUCTIONS_ED_ALL_ED_FT
Nasal Fracture    WHAT YOU NEED TO KNOW:    What is a nasal fracture? A nasal fracture is a crack or break in your nose. You may have a break in the upper nose (bridge), the side, or the septum. The septum is in the middle of the nose and divides your nostrils.    What are the signs and symptoms of a nasal fracture?   •Pain and swelling  •Nosebleed  •Deformed nose  •Crackling sound when you touch or move your nose  •Bruising on your nose or under your eyes    How is a nasal fracture diagnosed? Your healthcare provider will ask you when, where, and how the injury occurred. You may need any of the following:   •A nasal exam will be done to check your injury. You will be given pain medicine before your healthcare provider touches and looks at the outside and inside of your nose. He or she will remove blood clots and check for hematomas (collections of blood).  Septal Hematoma   •An x-ray or CT may show the nasal fracture. You may be given contrast liquid before the scan. Tell the healthcare provider if you have ever had an allergic reaction to contrast liquid.    How is a nasal fracture treated?   •Medicine may be given to decrease pain or help prevent a bacterial infection. Ask how to take pain medicine safely. Medicine may also be given to decrease nasal swelling and help make breathing easier.  •Wound care may help stop bleeding. If you have a hematoma inside your nose, it will be drained. Healthcare providers may place packing (gauze or other material) inside your nose to soak up blood.  •Closed reduction may be done to put your nasal bones back into the correct position. Local or general anesthesia is used during this procedure. This procedure may be done right away or several days after your injury when the swelling has gone down. Surgery (open reduction) to put your bones back into place may be needed for severe fractures.  •Splints or packing help keep your nose in place for 7 to 10 days after a reduction. Ask your healthcare provider how to care for your wounds, splint, or packing.    How do I care for my nasal fracture at home?   •Apply ice on your nose for 15 to 20 minutes every hour or as directed. Use an ice pack, or put crushed ice in a plastic bag. Cover it with a towel. Ice helps prevent tissue damage and decreases swelling and pain.  •Elevate your head when you lie down. This will help decrease swelling and pain. You may need to see a specialist 3 to 5 days later for tests or more treatment after swelling has gone down.  •Protect your nose to prevent bleeding, bruising, or another fracture. Try not to bump your nose on anything. You may not be able to play sports for up to 6 weeks.    When should I seek immediate care?   •You feel like one or both of your nasal passages are blocked and you have trouble breathing.  •Clear fluid is leaking from your nose.  •You have severe nose pain, even after you take medicine.  •You have double vision or have problems moving your eyes.    When should I call my doctor?   •You have a fever.  •You continue to have nosebleeds.  •You have a headache that gets worse, even after you take pain medicine.  •Your splint or packing is loose.  •You have questions or concerns about your condition or care.      A urinary tract infection (UTI) is caused by bacteria that get inside your urinary tract. Most bacteria that enter your urinary tract come out when you urinate. If the bacteria stay in your urinary tract, you may get an infection. Your urinary tract includes your kidneys, ureters, bladder, and urethra. Urine is made in your kidneys, and it flows from the ureters to the bladder. Urine leaves the bladder through the urethra. A UTI is more common in your lower urinary tract, which includes your bladder and urethra.    To prevent another UTI, you can do the following:  Empty your bladder often.   Wipe from front to back after you urinate or have a bowel movement. This will help prevent germs from getting into your urinary tract.  Drink water, but do not drink alcohol, caffeine, or citrus juices. These can irritate your bladder and increase your symptoms.  Urinate after you have sex. This can help flush out bacteria passed during sex.  Do not douche or use feminine deodorants. These can change the chemical balance in your vagina.  Do pelvic muscle exercises often. Pelvic muscle exercises may help you start and stop urinating. Strong pelvic muscles may help you empty your bladder easier. Squeeze these muscles tightly for 5 seconds like you are trying to hold back urine. Then relax for 5 seconds. Gradually work up to squeezing for 10 seconds. Do 3 sets of 15 repetitions a day    Seek care immediately if:  •You are urinating very little or not at all.  •You have a high fever with shaking chills.  •You have side or back pain that gets worse.

## 2021-07-02 NOTE — ED PROVIDER NOTE - CARE PLAN
Principal Discharge DX:	Acute cystitis without hematuria  Secondary Diagnosis:	Hematoma  Secondary Diagnosis:	Contusion of nose, initial encounter   Principal Discharge DX:	Acute cystitis without hematuria  Secondary Diagnosis:	Hematoma  Secondary Diagnosis:	Contusion of nose, initial encounter  Secondary Diagnosis:	Closed fracture of nasal bone, initial encounter

## 2021-07-02 NOTE — ED PROVIDER NOTE - PATIENT PORTAL LINK FT
You can access the FollowMyHealth Patient Portal offered by [facility] by registering at the following website: [email or web address]. By joining [facility]’s FollowMyHealth portal, you will also be able to view your health information using other applications (apps) compatible with our system.

## 2021-07-02 NOTE — ED PROVIDER NOTE - CARE PROVIDER_API CALL
Parris Navarro Y  INTERNAL MEDICINE  [address] [place] [address]  Phone: [phone]  Fax: [phone]  Established Patient  Follow Up Time: 1-3 Days

## 2021-07-02 NOTE — ED PROVIDER NOTE - ATTENDING CONTRIBUTION TO CARE
86F with PMH Parkinson's, dementia, HTN, HLD, hx of UTI, seizures, anxiety, T2DM, R hip ORIF, who was BIBEMS from [facility] for uwitnessed fall. At 1:45pm she fell on the floor and hematoma noted to forehead and superficial laceration to bridge of nose with minor bleeding. Not on blood thinners. MD ordered to send her to the ED for CT scan of head. EMS report stable VS en route. .     Nursing supervisor: Maru Bar: [phone]  MD: Dr. Mays: [phone]    Gen - NAD, Head - 7x7cm hematoma to forehead, superficial abrasion to bridge of nose, Pharynx - clear, MMM, Heart - RRR, no m/g/r, Lungs - CTAB, no w/c/r, Abdomen - soft, NT, ND, Skin - No rash, Extremities - FROM, no edema, erythema, ecchymosis, brisk cap refill, Neuro - A&O x0, equal strength and sensation, non-focal exam    a/p: CT brain, C-spine, maxillofacial, labs, ekg, cxr, pelvis XR and reassess. 86F with PMH Parkinson's, dementia, HTN, HLD, hx of UTI, seizures, anxiety, T2DM, R hip ORIF, who was BIBEMS from [facility] for unwitnessed fall. At 1:45pm she fell on the floor and hematoma noted to forehead and superficial laceration to bridge of nose with minor bleeding. Not on blood thinners. MD ordered to send her to the ED for CT scan of head. EMS report stable VS en route. Mentating baseline per note from NH. .     Nursing supervisor: Maru Bar: [phone]  MD: Dr. Mays: [phone]    Gen - NAD, Head - 7x7cm hematoma to forehead, superficial abrasion to bridge of nose, Pharynx - clear, MMM, Heart - RRR, no m/g/r, Lungs - CTAB, no w/c/r, Abdomen - soft, NT, ND, Skin - No rash, Extremities - FROM, no edema, erythema, ecchymosis, brisk cap refill, Neuro - A&O x0, equal strength and sensation, non-focal exam    a/p: CT brain, C-spine, maxillofacial, labs, ekg, cxr, pelvis XR and reassess.

## 2022-04-28 NOTE — ED ADULT TRIAGE NOTE - ACCOMPANIED BY
[facility] called in and stated that the patient's clobetasol 0.06 % in white petrolatum ointment is too expensive and would like to speak with someone regarding different options. Please call to discuss.    EMT/paramedic

## 2024-04-09 NOTE — ED PROVIDER NOTE - NS ED SCRIBE STATEMENT
Patient called because he is having blood clots and having severe pain. Patient is scheduled on 4/11 for catheter removal but would like fu on what he can do in the meantime.    Attending

## 2024-05-22 NOTE — ED BEHAVIORAL HEALTH ASSESSMENT NOTE - BEHAVIOR
[FreeTextEntry1] : Mr. BELLA zamorano is a 44 year man who presented with pain on multiple areas.  pain on his back, and neck pain for more than a year. but worse for 6 months. The pain is constant on the back but at shoulders comes and goes. When he started doing thing, he would experience pain starting from his hands going up to his shoulders. His back is at lower back area, and does not radiate to anywhere. He also has numbness on his both hands, and fingers. He is able to play sports, denies SOB, or exercise intolerance. 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 He did try Tylenol but it did not work. PMH: DLP  Interval history: 11/7/2023 He reports his pain is better. He has done couple sessions with physical therapy. Pain on his back is much better. He applied cream which helped. Denies weakness, numbness.  Interval history: 1/29/2024 He has pain on his left arm, He usually holds his son with his left arm. His son has cognitive problem and required all basic needs. It is muscle pain. He still has back pain, starting on his hip came up to his lower back. If he stands still for more than 5 mins. he would experience pain. When he moved around the pain is better. No weakness, numbness, bowel/bladder problems. PT was helping.  Interval history: 5/22/24 He still has pain on his lower back intermittently. He does not want injection. He takes pain meds sometimes. Denies weakness, numbness. 
Uncooperative

## 2024-10-16 ENCOUNTER — INPATIENT (INPATIENT)
Facility: HOSPITAL | Age: 89
LOS: 3 days | Discharge: NURSING, REHAB OR LONG-TERM CARE | DRG: 872 | End: 2024-10-20
Attending: HOSPITALIST | Admitting: INTERNAL MEDICINE
Payer: MEDICARE

## 2024-10-16 VITALS
TEMPERATURE: 99 F | HEART RATE: 119 BPM | DIASTOLIC BLOOD PRESSURE: 73 MMHG | SYSTOLIC BLOOD PRESSURE: 127 MMHG | RESPIRATION RATE: 18 BRPM | OXYGEN SATURATION: 98 %

## 2024-10-16 LAB
FLUAV AG NPH QL: SIGNIFICANT CHANGE UP
FLUBV AG NPH QL: SIGNIFICANT CHANGE UP
GAS PNL BLDV: SIGNIFICANT CHANGE UP
RSV RNA NPH QL NAA+NON-PROBE: SIGNIFICANT CHANGE UP
SARS-COV-2 RNA SPEC QL NAA+PROBE: SIGNIFICANT CHANGE UP

## 2024-10-16 PROCEDURE — 71045 X-RAY EXAM CHEST 1 VIEW: CPT | Mod: 26

## 2024-10-16 PROCEDURE — 93010 ELECTROCARDIOGRAM REPORT: CPT

## 2024-10-16 PROCEDURE — 99285 EMERGENCY DEPT VISIT HI MDM: CPT

## 2024-10-16 RX ORDER — CEFEPIME 2 G/1
1000 INJECTION, POWDER, FOR SOLUTION INTRAVENOUS ONCE
Refills: 0 | Status: COMPLETED | OUTPATIENT
Start: 2024-10-16 | End: 2024-10-16

## 2024-10-16 RX ORDER — ACETAMINOPHEN 500 MG
650 TABLET ORAL ONCE
Refills: 0 | Status: COMPLETED | OUTPATIENT
Start: 2024-10-16 | End: 2024-10-16

## 2024-10-16 RX ADMIN — Medication 650 MILLIGRAM(S): at 22:17

## 2024-10-16 RX ADMIN — CEFEPIME 100 MILLIGRAM(S): 2 INJECTION, POWDER, FOR SOLUTION INTRAVENOUS at 22:17

## 2024-10-16 RX ADMIN — Medication 1650 MILLILITER(S): at 22:17

## 2024-10-16 NOTE — ED ADULT TRIAGE NOTE - CHIEF COMPLAINT QUOTE
pt biba from nursing home for high fever. pt given tylenol 45 minutes prior to arrival. pt is non verbal at baseline

## 2024-10-17 DIAGNOSIS — A41.9 SEPSIS, UNSPECIFIED ORGANISM: ICD-10-CM

## 2024-10-17 LAB
A1C WITH ESTIMATED AVERAGE GLUCOSE RESULT: 6.7 % — HIGH (ref 4–5.6)
ALBUMIN SERPL ELPH-MCNC: 3.6 G/DL — SIGNIFICANT CHANGE UP (ref 3.5–5.2)
ALBUMIN SERPL ELPH-MCNC: 3.9 G/DL — SIGNIFICANT CHANGE UP (ref 3.5–5.2)
ALP SERPL-CCNC: 101 U/L — SIGNIFICANT CHANGE UP (ref 30–115)
ALP SERPL-CCNC: 94 U/L — SIGNIFICANT CHANGE UP (ref 30–115)
ALT FLD-CCNC: 9 U/L — SIGNIFICANT CHANGE UP (ref 0–41)
ALT FLD-CCNC: <5 U/L — SIGNIFICANT CHANGE UP (ref 0–41)
ANION GAP SERPL CALC-SCNC: 11 MMOL/L — SIGNIFICANT CHANGE UP (ref 7–14)
ANION GAP SERPL CALC-SCNC: 14 MMOL/L — SIGNIFICANT CHANGE UP (ref 7–14)
APPEARANCE UR: CLEAR — SIGNIFICANT CHANGE UP
APTT BLD: 29.3 SEC — SIGNIFICANT CHANGE UP (ref 27–39.2)
AST SERPL-CCNC: 15 U/L — SIGNIFICANT CHANGE UP (ref 0–41)
AST SERPL-CCNC: 15 U/L — SIGNIFICANT CHANGE UP (ref 0–41)
BASE EXCESS BLDV CALC-SCNC: 1.8 MMOL/L — SIGNIFICANT CHANGE UP (ref -2–3)
BASOPHILS # BLD AUTO: 0 K/UL — SIGNIFICANT CHANGE UP (ref 0–0.2)
BASOPHILS # BLD AUTO: 0.03 K/UL — SIGNIFICANT CHANGE UP (ref 0–0.2)
BASOPHILS NFR BLD AUTO: 0 % — SIGNIFICANT CHANGE UP (ref 0–1)
BASOPHILS NFR BLD AUTO: 0.1 % — SIGNIFICANT CHANGE UP (ref 0–1)
BILIRUB SERPL-MCNC: 0.2 MG/DL — SIGNIFICANT CHANGE UP (ref 0.2–1.2)
BILIRUB SERPL-MCNC: 0.5 MG/DL — SIGNIFICANT CHANGE UP (ref 0.2–1.2)
BILIRUB UR-MCNC: NEGATIVE — SIGNIFICANT CHANGE UP
BUN SERPL-MCNC: 15 MG/DL — SIGNIFICANT CHANGE UP (ref 10–20)
BUN SERPL-MCNC: 24 MG/DL — HIGH (ref 10–20)
CA-I SERPL-SCNC: 1.25 MMOL/L — SIGNIFICANT CHANGE UP (ref 1.15–1.33)
CALCIUM SERPL-MCNC: 9.3 MG/DL — SIGNIFICANT CHANGE UP (ref 8.4–10.5)
CALCIUM SERPL-MCNC: 9.5 MG/DL — SIGNIFICANT CHANGE UP (ref 8.4–10.5)
CHLORIDE SERPL-SCNC: 102 MMOL/L — SIGNIFICANT CHANGE UP (ref 98–110)
CHLORIDE SERPL-SCNC: 104 MMOL/L — SIGNIFICANT CHANGE UP (ref 98–110)
CHOLEST SERPL-MCNC: 160 MG/DL — SIGNIFICANT CHANGE UP
CO2 SERPL-SCNC: 22 MMOL/L — SIGNIFICANT CHANGE UP (ref 17–32)
CO2 SERPL-SCNC: 27 MMOL/L — SIGNIFICANT CHANGE UP (ref 17–32)
COLOR SPEC: YELLOW — SIGNIFICANT CHANGE UP
CREAT SERPL-MCNC: 0.7 MG/DL — SIGNIFICANT CHANGE UP (ref 0.7–1.5)
CREAT SERPL-MCNC: 0.8 MG/DL — SIGNIFICANT CHANGE UP (ref 0.7–1.5)
DIFF PNL FLD: ABNORMAL
EGFR: 70 ML/MIN/1.73M2 — SIGNIFICANT CHANGE UP
EGFR: 83 ML/MIN/1.73M2 — SIGNIFICANT CHANGE UP
EOSINOPHIL # BLD AUTO: 0 K/UL — SIGNIFICANT CHANGE UP (ref 0–0.7)
EOSINOPHIL # BLD AUTO: 0.01 K/UL — SIGNIFICANT CHANGE UP (ref 0–0.7)
EOSINOPHIL NFR BLD AUTO: 0 % — SIGNIFICANT CHANGE UP (ref 0–8)
EOSINOPHIL NFR BLD AUTO: 0 % — SIGNIFICANT CHANGE UP (ref 0–8)
ESTIMATED AVERAGE GLUCOSE: 146 MG/DL — HIGH (ref 68–114)
GAS PNL BLDV: 136 MMOL/L — SIGNIFICANT CHANGE UP (ref 136–145)
GAS PNL BLDV: SIGNIFICANT CHANGE UP
GAS PNL BLDV: SIGNIFICANT CHANGE UP
GLUCOSE SERPL-MCNC: 100 MG/DL — HIGH (ref 70–99)
GLUCOSE SERPL-MCNC: 149 MG/DL — HIGH (ref 70–99)
GLUCOSE UR QL: NEGATIVE MG/DL — SIGNIFICANT CHANGE UP
HCO3 BLDV-SCNC: 28 MMOL/L — SIGNIFICANT CHANGE UP (ref 22–29)
HCT VFR BLD CALC: 28.7 % — LOW (ref 37–47)
HCT VFR BLD CALC: 31.2 % — LOW (ref 37–47)
HCT VFR BLDA CALC: 26 % — LOW (ref 34.5–46.5)
HDLC SERPL-MCNC: 47 MG/DL — LOW
HGB BLD CALC-MCNC: 8.8 G/DL — LOW (ref 11.7–16.1)
HGB BLD-MCNC: 8.6 G/DL — LOW (ref 12–16)
HGB BLD-MCNC: 9.4 G/DL — LOW (ref 12–16)
IMM GRANULOCYTES NFR BLD AUTO: 0.6 % — HIGH (ref 0.1–0.3)
INR BLD: 1.14 RATIO — SIGNIFICANT CHANGE UP (ref 0.65–1.3)
KETONES UR-MCNC: NEGATIVE MG/DL — SIGNIFICANT CHANGE UP
LACTATE BLDV-MCNC: 2.6 MMOL/L — HIGH (ref 0.5–2)
LACTATE SERPL-SCNC: 2.2 MMOL/L — HIGH (ref 0.7–2)
LEUKOCYTE ESTERASE UR-ACNC: ABNORMAL
LIPID PNL WITH DIRECT LDL SERPL: 98 MG/DL — SIGNIFICANT CHANGE UP
LYMPHOCYTES # BLD AUTO: 1.1 K/UL — LOW (ref 1.2–3.4)
LYMPHOCYTES # BLD AUTO: 14.2 % — LOW (ref 20.5–51.1)
LYMPHOCYTES # BLD AUTO: 2.87 K/UL — SIGNIFICANT CHANGE UP (ref 1.2–3.4)
LYMPHOCYTES # BLD AUTO: 5.2 % — LOW (ref 20.5–51.1)
MCHC RBC-ENTMCNC: 22 PG — LOW (ref 27–31)
MCHC RBC-ENTMCNC: 22.1 PG — LOW (ref 27–31)
MCHC RBC-ENTMCNC: 30 G/DL — LOW (ref 32–37)
MCHC RBC-ENTMCNC: 30.1 G/DL — LOW (ref 32–37)
MCV RBC AUTO: 73.4 FL — LOW (ref 81–99)
MCV RBC AUTO: 73.4 FL — LOW (ref 81–99)
MONOCYTES # BLD AUTO: 1.08 K/UL — HIGH (ref 0.1–0.6)
MONOCYTES # BLD AUTO: 1.48 K/UL — HIGH (ref 0.1–0.6)
MONOCYTES NFR BLD AUTO: 5.4 % — SIGNIFICANT CHANGE UP (ref 1.7–9.3)
MONOCYTES NFR BLD AUTO: 7 % — SIGNIFICANT CHANGE UP (ref 1.7–9.3)
NEUTROPHILS # BLD AUTO: 16.05 K/UL — HIGH (ref 1.4–6.5)
NEUTROPHILS # BLD AUTO: 18.43 K/UL — HIGH (ref 1.4–6.5)
NEUTROPHILS NFR BLD AUTO: 79.7 % — HIGH (ref 42.2–75.2)
NEUTROPHILS NFR BLD AUTO: 85.2 % — HIGH (ref 42.2–75.2)
NITRITE UR-MCNC: NEGATIVE — SIGNIFICANT CHANGE UP
NON HDL CHOLESTEROL: 113 MG/DL — SIGNIFICANT CHANGE UP
NRBC # BLD: 0 /100 WBCS — SIGNIFICANT CHANGE UP (ref 0–0)
PCO2 BLDV: 52 MMHG — HIGH (ref 39–42)
PH BLDV: 7.34 — SIGNIFICANT CHANGE UP (ref 7.32–7.43)
PH UR: 6 — SIGNIFICANT CHANGE UP (ref 5–8)
PLATELET # BLD AUTO: 226 K/UL — SIGNIFICANT CHANGE UP (ref 130–400)
PLATELET # BLD AUTO: 251 K/UL — SIGNIFICANT CHANGE UP (ref 130–400)
PMV BLD: 11.1 FL — HIGH (ref 7.4–10.4)
PMV BLD: 11.3 FL — HIGH (ref 7.4–10.4)
PO2 BLDV: 25 MMHG — SIGNIFICANT CHANGE UP (ref 25–45)
POTASSIUM BLDV-SCNC: 4.3 MMOL/L — SIGNIFICANT CHANGE UP (ref 3.5–5.1)
POTASSIUM SERPL-MCNC: 4.1 MMOL/L — SIGNIFICANT CHANGE UP (ref 3.5–5)
POTASSIUM SERPL-MCNC: 4.2 MMOL/L — SIGNIFICANT CHANGE UP (ref 3.5–5)
POTASSIUM SERPL-SCNC: 4.1 MMOL/L — SIGNIFICANT CHANGE UP (ref 3.5–5)
POTASSIUM SERPL-SCNC: 4.2 MMOL/L — SIGNIFICANT CHANGE UP (ref 3.5–5)
PROT SERPL-MCNC: 6.4 G/DL — SIGNIFICANT CHANGE UP (ref 6–8)
PROT SERPL-MCNC: 7.1 G/DL — SIGNIFICANT CHANGE UP (ref 6–8)
PROT UR-MCNC: SIGNIFICANT CHANGE UP MG/DL
PROTHROM AB SERPL-ACNC: 13 SEC — HIGH (ref 9.95–12.87)
RBC # BLD: 3.91 M/UL — LOW (ref 4.2–5.4)
RBC # BLD: 4.25 M/UL — SIGNIFICANT CHANGE UP (ref 4.2–5.4)
RBC # FLD: 19 % — HIGH (ref 11.5–14.5)
RBC # FLD: 19 % — HIGH (ref 11.5–14.5)
SAO2 % BLDV: 32.1 % — LOW (ref 67–88)
SODIUM SERPL-SCNC: 140 MMOL/L — SIGNIFICANT CHANGE UP (ref 135–146)
SODIUM SERPL-SCNC: 140 MMOL/L — SIGNIFICANT CHANGE UP (ref 135–146)
SP GR SPEC: 1.01 — SIGNIFICANT CHANGE UP (ref 1–1.03)
TRIGL SERPL-MCNC: 74 MG/DL — SIGNIFICANT CHANGE UP
UROBILINOGEN FLD QL: 0.2 MG/DL — SIGNIFICANT CHANGE UP (ref 0.2–1)
WBC # BLD: 20.16 K/UL — HIGH (ref 4.8–10.8)
WBC # BLD: 21.21 K/UL — HIGH (ref 4.8–10.8)
WBC # FLD AUTO: 20.16 K/UL — HIGH (ref 4.8–10.8)
WBC # FLD AUTO: 21.21 K/UL — HIGH (ref 4.8–10.8)

## 2024-10-17 PROCEDURE — 93970 EXTREMITY STUDY: CPT

## 2024-10-17 PROCEDURE — 84132 ASSAY OF SERUM POTASSIUM: CPT

## 2024-10-17 PROCEDURE — 36415 COLL VENOUS BLD VENIPUNCTURE: CPT

## 2024-10-17 PROCEDURE — 85014 HEMATOCRIT: CPT

## 2024-10-17 PROCEDURE — 84145 PROCALCITONIN (PCT): CPT

## 2024-10-17 PROCEDURE — 85018 HEMOGLOBIN: CPT

## 2024-10-17 PROCEDURE — 99497 ADVNCD CARE PLAN 30 MIN: CPT | Mod: 25

## 2024-10-17 PROCEDURE — 83735 ASSAY OF MAGNESIUM: CPT

## 2024-10-17 PROCEDURE — 85379 FIBRIN DEGRADATION QUANT: CPT

## 2024-10-17 PROCEDURE — 82330 ASSAY OF CALCIUM: CPT

## 2024-10-17 PROCEDURE — 83605 ASSAY OF LACTIC ACID: CPT

## 2024-10-17 PROCEDURE — 80061 LIPID PANEL: CPT

## 2024-10-17 PROCEDURE — 71045 X-RAY EXAM CHEST 1 VIEW: CPT

## 2024-10-17 PROCEDURE — 83036 HEMOGLOBIN GLYCOSYLATED A1C: CPT

## 2024-10-17 PROCEDURE — 92526 ORAL FUNCTION THERAPY: CPT | Mod: GN

## 2024-10-17 PROCEDURE — 82803 BLOOD GASES ANY COMBINATION: CPT

## 2024-10-17 PROCEDURE — 84295 ASSAY OF SERUM SODIUM: CPT

## 2024-10-17 PROCEDURE — 99223 1ST HOSP IP/OBS HIGH 75: CPT

## 2024-10-17 PROCEDURE — 80053 COMPREHEN METABOLIC PANEL: CPT

## 2024-10-17 PROCEDURE — 92610 EVALUATE SWALLOWING FUNCTION: CPT | Mod: GN

## 2024-10-17 PROCEDURE — 85025 COMPLETE CBC W/AUTO DIFF WBC: CPT

## 2024-10-17 RX ORDER — ACETAMINOPHEN 500 MG
650 TABLET ORAL EVERY 6 HOURS
Refills: 0 | Status: DISCONTINUED | OUTPATIENT
Start: 2024-10-17 | End: 2024-10-20

## 2024-10-17 RX ORDER — MELATONIN 5 MG
3 TABLET ORAL AT BEDTIME
Refills: 0 | Status: DISCONTINUED | OUTPATIENT
Start: 2024-10-17 | End: 2024-10-20

## 2024-10-17 RX ORDER — ONDANSETRON HYDROCHLORIDE 2 MG/ML
4 INJECTION, SOLUTION INTRAMUSCULAR; INTRAVENOUS EVERY 8 HOURS
Refills: 0 | Status: DISCONTINUED | OUTPATIENT
Start: 2024-10-17 | End: 2024-10-20

## 2024-10-17 RX ORDER — CARBIDOPA/LEVODOPA 10MG-100MG
0.5 TABLET ORAL
Refills: 0 | DISCHARGE

## 2024-10-17 RX ORDER — INSULIN GLARGINE,HUM.REC.ANLOG 100/ML
12 VIAL (ML) SUBCUTANEOUS
Refills: 0 | DISCHARGE

## 2024-10-17 RX ORDER — CEFEPIME 2 G/1
2000 INJECTION, POWDER, FOR SOLUTION INTRAVENOUS EVERY 12 HOURS
Refills: 0 | Status: DISCONTINUED | OUTPATIENT
Start: 2024-10-17 | End: 2024-10-17

## 2024-10-17 RX ORDER — ACETAMINOPHEN 500 MG
1000 TABLET ORAL ONCE
Refills: 0 | Status: COMPLETED | OUTPATIENT
Start: 2024-10-17 | End: 2024-10-17

## 2024-10-17 RX ORDER — CEFEPIME 2 G/1
INJECTION, POWDER, FOR SOLUTION INTRAVENOUS
Refills: 0 | Status: DISCONTINUED | OUTPATIENT
Start: 2024-10-17 | End: 2024-10-17

## 2024-10-17 RX ORDER — LEVETIRACETAM 500 MG/1
1000 TABLET, FILM COATED ORAL
Refills: 0 | Status: DISCONTINUED | OUTPATIENT
Start: 2024-10-17 | End: 2024-10-20

## 2024-10-17 RX ORDER — LEVOFLOXACIN 750 MG/1
750 TABLET, FILM COATED ORAL
Refills: 0 | Status: DISCONTINUED | OUTPATIENT
Start: 2024-10-17 | End: 2024-10-17

## 2024-10-17 RX ORDER — CARBIDOPA/LEVODOPA 10MG-100MG
0.5 TABLET ORAL EVERY 8 HOURS
Refills: 0 | Status: DISCONTINUED | OUTPATIENT
Start: 2024-10-17 | End: 2024-10-20

## 2024-10-17 RX ORDER — BENZTROPINE MESYLATE 0.5 MG
1 TABLET ORAL
Refills: 0 | DISCHARGE

## 2024-10-17 RX ORDER — CEFEPIME 2 G/1
2000 INJECTION, POWDER, FOR SOLUTION INTRAVENOUS ONCE
Refills: 0 | Status: COMPLETED | OUTPATIENT
Start: 2024-10-17 | End: 2024-10-17

## 2024-10-17 RX ORDER — CEFTRIAXONE SODIUM 10 G
1000 VIAL (EA) INJECTION EVERY 24 HOURS
Refills: 0 | Status: DISCONTINUED | OUTPATIENT
Start: 2024-10-17 | End: 2024-10-20

## 2024-10-17 RX ORDER — BENZTROPINE MESYLATE 0.5 MG
0.5 TABLET ORAL DAILY
Refills: 0 | Status: DISCONTINUED | OUTPATIENT
Start: 2024-10-17 | End: 2024-10-20

## 2024-10-17 RX ORDER — MAGNESIUM, ALUMINUM HYDROXIDE 200-200 MG
30 TABLET,CHEWABLE ORAL EVERY 4 HOURS
Refills: 0 | Status: DISCONTINUED | OUTPATIENT
Start: 2024-10-17 | End: 2024-10-20

## 2024-10-17 RX ORDER — LEVETIRACETAM 500 MG/1
10 TABLET, FILM COATED ORAL
Refills: 0 | DISCHARGE

## 2024-10-17 RX ORDER — ENOXAPARIN SODIUM 40MG/0.4ML
40 SYRINGE (ML) SUBCUTANEOUS EVERY 24 HOURS
Refills: 0 | Status: DISCONTINUED | OUTPATIENT
Start: 2024-10-17 | End: 2024-10-20

## 2024-10-17 RX ADMIN — Medication 0.5 TABLET(S): at 13:22

## 2024-10-17 RX ADMIN — Medication 0.5 MILLIGRAM(S): at 12:01

## 2024-10-17 RX ADMIN — Medication 75 MILLILITER(S): at 20:26

## 2024-10-17 RX ADMIN — Medication 40 MILLIGRAM(S): at 13:22

## 2024-10-17 RX ADMIN — CEFEPIME 100 MILLIGRAM(S): 2 INJECTION, POWDER, FOR SOLUTION INTRAVENOUS at 07:28

## 2024-10-17 RX ADMIN — LEVETIRACETAM 1000 MILLIGRAM(S): 500 TABLET, FILM COATED ORAL at 17:02

## 2024-10-17 RX ADMIN — Medication 400 MILLIGRAM(S): at 07:00

## 2024-10-17 RX ADMIN — Medication 100 MILLIGRAM(S): at 12:03

## 2024-10-17 RX ADMIN — Medication 1650 MILLILITER(S): at 00:00

## 2024-10-17 RX ADMIN — Medication 0.5 TABLET(S): at 21:28

## 2024-10-17 RX ADMIN — Medication 75 MILLILITER(S): at 07:00

## 2024-10-17 NOTE — ED PROVIDER NOTE - CARE PLAN
Principal Discharge DX:	Sepsis  Secondary Diagnosis:	Cough   1 Principal Discharge DX:	Sepsis  Secondary Diagnosis:	Cough  Secondary Diagnosis:	Acute UTI

## 2024-10-17 NOTE — PATIENT PROFILE ADULT - BILL PAYMENT
Madie Urrutia. : 1950  [place]  Secondary: [place] [employer] [id number] at [facility]  [address] [facility], [address] [place] [address],[address], [facility] [address]  [phone]   [phone]        OUTPATIENT PHYSICAL THERAPY: Daily Treatment Note 10/25/2019  Visit Count:  1    ICD-10: Treatment Diagnosis:   · Muscle weakness (generalized) (M62.81)   Precautions/Allergies:   Amlodipine   TREATMENT PLAN:  Effective Dates: 10/25/2019 TO 2020 (90 days). Frequency/Duration: 2 times a week for 90 Day(s)    Pre-treatment Symptoms/Complaints:  10/25/2019: Patient reports he always does better in the AM, so he is feeling alright right now. Pain: Initial: Pain Intensity 1: 5  Pain Location 1: Arm, Hand  Pain Orientation 1: Right  Post Session:  0/10   Medications Last Reviewed:  10/25/2019  Updated Objective Findings:  See evaluation note from today  TREATMENT:     THERAPEUTIC EXERCISE: (15 minutes):  Exercises per grid below to improve mobility and strength. Required minimal visual, verbal and manual cues to promote proper body alignment, promote proper body posture and promote proper body mechanics. Progressed resistance, range, repetitions and complexity of movement as indicated. Date:  10/25/2019   Activity/Exercise Parameters   Brace for wrist/thumb HEP   Wrist extensors stretch HEP   \"[place] [place]\" stretch HEP      Treatment/Session Summary:    · Response to Treatment:  Patient tolerated assessment without complaints of increased wrist pain. Patient verbalized and demonstrated understanding of HEP. · Communication/Consultation:  None today  · Equipment provided today:  None today  · Recommendations/Intent for next treatment session: Next visit will focus on improving overall mobility and pain.     Total Treatment Billable Duration:  15 minutes + evaluation  PT Patient Time In/Time Out  Time In: 1100  Time Out: [facility], PT    Future Appointments   Date Time Provider Tiana Sondra   10/25/2019 11:00 AM Howard December, PT [facility]   11/4/2019  9:00 AM KAREN Hendrickson [facility]   2/3/2020  9:15 AM [facility] ZENY [facility] no

## 2024-10-17 NOTE — H&P ADULT - ASSESSMENT
Septic on admission   UTI     PD   Dementia     HTN   HLD    CVA   Epilepsy  Schizophrenia    89-year-old non verbal female with a past medical history of Parkinson's, dementia, h/o GI bleed, hypertension, hyperlipidemia, history of epilepsy, CVA, and history of schizophrenia coming to the ED from [facility] for evaluation of fever that began today. She apparently was satting low 50s when EMS arrived. Labs : WBC 21K, Hb 9.4, BUn/Cr 24/0.8, VBG lactate 3.5 -->2.6, UA +, RVP neg. CXR : interstitial infiltrates. Admitted to medicine for sepsis 2/2 UTI.     #Septic on admission   #UTI   #CXR with B/L infiltrates  - No previous cultures   - F/U Ucx  - IV rocephin and then titrate based on cultures  - Trend WBC  - ID consult   - Trend lactate   - Pt still spiking fevers - tylenol prn  - IVF LR @75  - F/U procal and blood cultures    #PD   #Dementia   - C/W sinamet, benztropine     #HTN   #HLD  - C/W home Meds    #CVA   #Epilepsy  #Schizophrenia   - C/W home meds     #MISC   - Diet - diet per S/S  - DVT - Lovenox  - GI prophy - ppi  - Activity - wheelchair bound  89-year-old non verbal female with a past medical history of Parkinson's, dementia, h/o GI bleed, hypertension, hyperlipidemia, history of epilepsy, CVA, and history of schizophrenia coming to the ED from [facility] for evaluation of fever that began today. She apparently was satting low 50s when EMS arrived. Labs : WBC 21K, Hb 9.4, BUn/Cr 24/0.8, VBG lactate 3.5 -->2.6, UA +, RVP neg. CXR : interstitial infiltrates. Admitted to medicine for sepsis 2/2 UTI.     #Septic on admission   #UTI   #CXR with B/L infiltrates  - No previous cultures   - F/U Ucx  - Cefepime and levoflox for now and then titrate based on cultures  - Trend WBC  - ID consult   - Trend lactate   - Pt still spiking fevers - tylenol prn  - IVF LR @75  - F/U procal and blood cultures    #PD   #Dementia   - C/W sinamet, benztropine     #HTN   #HLD  - C/W home Meds    #CVA   #Epilepsy  #Schizophrenia   - C/W home meds     #MISC   - Diet - diet per S/S  - DVT - Lovenox  - GI prophy - ppi  - Activity - wheelchair bound

## 2024-10-17 NOTE — H&P ADULT - NSHPLABSRESULTS_GEN_ALL_CORE
Spoke to patient in regards to abnormal labs.    CBC Full  -  ( 17 Oct 2024 11:17 )  WBC Count : 20.16 K/uL  Hemoglobin : 8.6 g/dL  Hematocrit : 28.7 %  Platelet Count - Automated : 226 K/uL  Mean Cell Volume : 73.4 fL  Mean Cell Hemoglobin : 22.0 pg  Mean Cell Hemoglobin Concentration : 30.0 g/dL  Auto Neutrophil # : 16.05 K/uL  Auto Lymphocyte # : 2.87 K/uL  Auto Monocyte # : 1.08 K/uL  Auto Eosinophil # : 0.01 K/uL  Auto Basophil # : 0.03 K/uL  Auto Neutrophil % : 79.7 %  Auto Lymphocyte % : 14.2 %  Auto Monocyte % : 5.4 %  Auto Eosinophil % : 0.0 %  Auto Basophil % : 0.1 %    BMP:    10-17 @ 11:17    Blood Urea Nitrogen - 15  Calcium - 9.5  Carbond Dioxide - 27  Chloride - 102  Creatinine - 0.7  Glucose - 100  Potassium - 4.1  Sodium - 140      Hemoglobin A1c -   PT/INR - ( 17 Oct 2024 00:10 )   PT: 13.00 sec;   INR: 1.14 ratio         PTT - ( 17 Oct 2024 00:10 )  PTT:29.3 sec  Urine Culture:

## 2024-10-17 NOTE — CONSULT NOTE ADULT - ATTENDING COMMENTS
ASSESSMENT  89yFemale with a PMH of Parkinson's, dementia, GI bleed, hypertension, hyperlipidemia, history of epilepsy, CVA, and history of schizophrenia coming to the ED from [facility] for evaluation of fever and hypoxia, reportedly satting low 50s when EMS arrived. Patient is O2 sat is 94% in the ED, placed on 2L of O2 and satting at 98%. History limited as pt is nonverbal. No acute overnight events reported by the nurse. Pt seen bedside, awake and responds to stimuli, on premafit w/o urinary retention, no recent bowel movements w/ BS present, no vomiting, no recent fever, no longer on supplemental O2, satting at 98% on room air.     IMPRESSION/RECOMMENDATIONS  Immunosuppression/Immunosenescence ( above age 60 yrs there is a exponential decline in immunity which could result in poor clinical outcomes.   Sepsis ( /m , WBc 21.2 )   Acute pyelonephritis with no obstructive uropathy   Lactate 3.5 on admission  Toxic encephaolpathy  COVID 19/ Influenza/ RSV NG.     -UCX  -BCx   -monitor WBC  -Rocephin 1 gm iv q24h

## 2024-10-17 NOTE — ED PROVIDER NOTE - PHYSICAL EXAMINATION
Physical Exam    Vital Signs: I have reviewed the initial vital signs.  Constitutional: no acute distress  Eyes: Conjunctiva pink, Sclera clear  Cardiovascular: (+) tachycardic, regular rhythm, well-perfused extremities, radial pulses equal and 2+ b/l.   Respiratory: unlabored respiratory effort, (+) rhonchi b/l throughout. no accessory muscle use.   Gastrointestinal: soft, non-tender, nondistended abdomen, no pulsatile mass, no rebound, no guarding  Musculoskeletal: no lower extremity edema, no calf tenderness  Integumentary: warm, dry, no rash  Neurologic: pt is awake   Psychiatric: appropriate mood, appropriate affect

## 2024-10-17 NOTE — H&P ADULT - HISTORY OF PRESENT ILLNESS
89-year-old female with a past medical history of Parkinson's, dementia, hypertension, hyperlipidemia, history of epilepsy, CVA, and history of schizophrenia presents to the ED from [facility] for evaluation of fever that began today associated with patient being "hypoxic to 50%."As per patient's daughter at bedside they were told this by the [facility] home.  Patient's daughters report patient is wheelchair-bound at baseline and usually cannot engage in full conversation due to the dementia.  Patient's daughters noticed that patient is more lethargic than usual today.  Patient's daughter reports in the past they signed a MOLST form for DNR, DNI, do not hospitalize, and comfort measures only however this was signed when patient had a GI bleed in the past and they "thought patient was going to die." Patient's daughters would like to rescind the do not hospitalize and comfort measures only but would like to keep the patient DNR/DNI. Patient is O2 sat is 94% in the ED but we placed on 2L of O2 and goes to 98%.  · Temp at ED Arrival (C)	37.2 Degrees C · Oxygen Therapy Flow (L/min)	2 L/min · O2 Delivery/Oxygen Delivery Method	nasal cannula · SpO2 (%)	98 % · Temp site	oral · Temp (C)	37.2 Degrees C · Temp (F)	99 Degrees F · Respiration Rate (breaths/min)	18 /min · Heart Rate	  119 /min · BP Diastolic	73 mm Hg · BP Systolic	127 mm Hg  labs : WBC 21K, Hb 9.4, BUn/Cr 24/0.8, VBG lactate 3.5 -->2.6, UA +, RVP neg.   CXR : interstitial infiltrates   Patient is a 89-year-old non verbal female with a past medical history of Parkinson's, dementia, GI bleed, hypertension, hyperlipidemia, history of epilepsy, CVA, and history of schizophrenia coming to the ED from [facility] for evaluation of fever that began today. She apparently was satting low 50s when EMS arrived. History limited as pt is nonverbal and daughter cata not answering her phone.   At triage,  · Temp at ED Arrival (C)	37.2 Degrees C · Oxygen Therapy Flow (L/min)	2 L/min · O2 Delivery/Oxygen Delivery Method	nasal cannula · SpO2 (%)	98 % · Temp site	oral · Temp (C)	37.2 Degrees C · Temp (F)	99 Degrees F · Respiration Rate (breaths/min)	18 /min · Heart Rate	  119 /min · BP Diastolic	73 mm Hg · BP Systolic	127 mm Hg  labs : WBC 21K, Hb 9.4, BUn/Cr 24/0.8, VBG lactate 3.5 -->2.6, UA +, RVP neg.   CXR : interstitial infiltrates   Admitted to medicine for sepsis 2/2 UTI.

## 2024-10-17 NOTE — ED ADULT NURSE NOTE - OBJECTIVE STATEMENT
89 y/o female BIBA from [facility] for fever, lethargy, and cough x1 day

## 2024-10-17 NOTE — ED PROVIDER NOTE - ADMIT DISPOSITION PRESENT ON ADMISSION SEPSIS Q2 - IDEAL BODY WEIGHT USED FOR CRYSTALLOID FLUIDS
Detail Level: Detailed Quality 130: Documentation Of Current Medications In The Medical Record: Current Medications Documented Quality 402: Tobacco Use And Help With Quitting Among Adolescents: Patient screened for tobacco and never smoked Quality 431: Preventive Care And Screening: Unhealthy Alcohol Use - Screening: Patient screened for unhealthy alcohol use using a single question and scores less than 2 times per year Additional Notes: Patient did not know height, no pets cuff Quality 265: Biopsy Follow-Up: Biopsy results reviewed, communicated, tracked, and documented Quality 110: Preventive Care And Screening: Influenza Immunization: Influenza Immunization previously received during influenza season Yes

## 2024-10-17 NOTE — CONSULT NOTE ADULT - SUBJECTIVE AND OBJECTIVE BOX
BERTIN DEL RIO  89y, Female  Allergy: Seafood (Unknown)  No Known Drug Allergies      CHIEF COMPLAINT: Sepsis (17 Oct 2024 05:29)      HPI:  89yFemale with a non verbal female with a past medical history of Parkinson's, dementia, GI bleed, hypertension, hyperlipidemia, history of epilepsy, CVA, and history of schizophrenia coming to the ED from [facility] for evaluation of fever and hypoxia, reportedly satting low 50s when EMS arrived. Patient is O2 sat is 94% in the ED, placed on 2L of O2 and satting at 98%. History limited as pt is nonverbal. No acute overnight events reported by the nurse. Pt seen bedside, awake and responds to stimuli, on premafit w/o urinary retention, no recent bowel movements, no vomiting, no recent fever, no longer on supplemental O2, satting at 98% on room air.       Infectious Diseases History:  Old Micro Data/Cultures:     FAMILY HISTORY:  No pertinent family history in first degree relatives      PAST MEDICAL & SURGICAL HISTORY:  Diabetes      Dementia      Hypertension      Schizophrenia      No significant past surgical history          SOCIAL HISTORY  Social History:      Recent Travel:  Other Exposures:     ROS  General: Denies rigors, nightsweats  HEENT: Denies headache, rhinorrhea, sore throat, eye pain  CV: Denies CP, palpitations  PULM: Denies wheezing, hemoptysis  GI: Denies hematemesis, hematochezia, melena  : Denies discharge, hematuria  MSK: Denies arthralgias, myalgias  SKIN: Denies rash, lesions  NEURO: Denies paresthesias, weakness  PSYCH: Denies depression, anxiety    VITALS:  T(F): 98, Max: 101.3 (10--24 @ 21:31)  HR: 110  BP: 106/66  RR: 18Vital Signs Last 24 Hrs  T(C): 36.7 (17 Oct 2024 08:08), Max: 38.5 (16 Oct 2024 21:31)  T(F): 98 (17 Oct 2024 08:08), Max: 101.3 (16 Oct 2024 21:31)  HR: 110 (17 Oct 2024 08:08) (110 - 119)  BP: 106/66 (17 Oct 2024 08:08) (106/66 - 144/76)  BP(mean): --  RR: 18 (17 Oct 2024 08:08) (18 - 19)  SpO2: 99% (17 Oct 2024 08:08) (97% - 99%)    Parameters below as of 17 Oct 2024 08:08  Patient On (Oxygen Delivery Method): nasal cannula        PHYSICAL EXAM:  GEN: NAD, resting in bed  HEENT: normocephalic, atraumatic   CV: tachycardic, normal rhythm   LUNG: B/L BS, no wheezing  GI: +BS, non-tender, soft abdomen   : premafit, no retention, urine production normal   NEURO: awake, responsive to stimuli  SKIN: no wounds, no erythema, normal color    TESTS & MEASUREMENTS:                        9.4    21.21 )-----------( 251      ( 17 Oct 2024 00:10 )             31.2     10-17    140  |  104  |  24[H]  ----------------------------<  149[H]  4.2   |  22  |  0.8    Ca    9.3      17 Oct 2024 00:10    TPro  7.1  /  Alb  3.9  /  TBili  0.2  /  DBili  x   /  AST  15  /  ALT  <5  /  AlkPhos  101  10-17      LIVER FUNCTIONS - ( 17 Oct 2024 00:10 )  Alb: 3.9 g/dL / Pro: 7.1 g/dL / ALK PHOS: 101 U/L / ALT: <5 U/L / AST: 15 U/L / GGT: x           Urinalysis Basic - ( 17 Oct 2024 01:27 )    Color: Yellow / Appearance: Clear / S.013 / pH: x  Gluc: x / Ketone: Negative mg/dL  / Bili: Negative / Urobili: 0.2 mg/dL   Blood: x / Protein: Trace mg/dL / Nitrite: Negative   Leuk Esterase: Large / RBC: 2 /HPF /  /HPF   Sq Epi: x / Non Sq Epi: 1 /HPF / Bacteria: Few /HPF        Urinalysis with Rflx Culture (collected 10-17-24 @ 01:27)        Blood Gas Venous - Lactate: 2.6 mmol/L (10-17-24 @ 02:09)  Blood Gas Venous - Lactate: 3.5 mmol/L (10-16-24 @ 23:15)      INFECTIOUS DISEASES TESTING      RADIOLOGY & ADDITIONAL TESTS:  I have personally reviewed the last available Chest xray  CXR  Xray Chest 1 View- PORTABLE-Urgent:   ACC: [id number] EXAM:  XR CHEST PORTABLE URGENT 1V   ORDERED BY: MARISSA CORONADO     PROCEDURE DATE:  10/16/2024          INTERPRETATION:  CLINICAL HISTORY / REASON FOR EXAM: Shortness of breath.    COMPARISON: Chest radiograph from 2021.    TECHNIQUE/POSITIONING: Satisfactory. Single image, AP chest radiograph.    FINDINGS:    SUPPORT DEVICES: None.    CARDIAC/MEDIASTINUM/HILUM: Stable thoracic aorta calcifications.    LUNG PARENCHYMA/PLEURA: No focal consolidation or pleural effusion. No   pneumothorax.    SKELETON/SOFT TISSUES: Unremarkable.      IMPRESSION:    No radiographic evidence of acute cardiopulmonary disease.    --- End of Report ---            CLAUDIA HOLMAN MD; Attending Radiologist  This document has been electronically signed. Oct 16 2024 11:23PM (10-16-24 @ 22:57)      CT      CARDIOLOGY TESTING  12 Lead ECG:   Ventricular Rate 117 BPM    Atrial Rate 117 BPM    P-R Interval 150 ms    QRS Duration 80 ms    Q-T Interval 344 ms    QTC Calculation(Bazett) 479 ms    P Axis 36 degrees    R Axis -7 degrees    T Axis 76 degrees    Diagnosis Line Sinus tachycardia  Otherwise normal ECG    Confirmed by JOSE ELIAS GREGG MD ([id number]) on 10/17/2024 6:48:44 AM (10-16-24 @ 22:01)      All available historical records have been reviewed    MEDICATIONS  benztropine 0.5  carbidopa/levodopa  25/100 0.5  cefTRIAXone   IVPB 1000  enoxaparin Injectable 40  lactated ringers. 1000  levETIRAcetam  Solution 1000      ANTIBIOTICS:  cefTRIAXone   IVPB 1000 milliGRAM(s) IV Intermittent every 24 hours      All available historical data has been reviewed    ASSESSMENT  89yFemale with a PMH of Parkinson's, dementia, GI bleed, hypertension, hyperlipidemia, history of epilepsy, CVA, and history of schizophrenia coming to the ED from [facility] for evaluation of fever and hypoxia, reportedly satting low 50s when EMS arrived. Patient is O2 sat is 94% in the ED, placed on 2L of O2 and satting at 98%. History limited as pt is nonverbal. No acute overnight events reported by the nurse. Pt seen bedside, awake and responds to stimuli, on premafit w/o urinary retention, no recent bowel movements w/ BS present, no vomiting, no recent fever, no longer on supplemental O2, satting at 98% on room air.     IMPRESSION  #  Infection w/ Leukocytosis  # Hypoxia  # Fever     RECOMMENDATIONS  - continue CefTRIAXone 1g Q24 H  - continue to monitor and trend WBC  - f/u Blood Cx results  - f/u Urine Cx results  - Repeat Lactate  - Place on supplemental O2 if sats drop below 95     - currently sat at 98 on room air   - continue anti-pyretics if fever persists  - monitor for bowel movement - check for melena    This is a pended note. All final recommendations to follow pending discussion with ID Attending

## 2024-10-17 NOTE — ED ADULT NURSE NOTE - NSFALLUNIVINTERV_ED_ALL_ED
Bed/Stretcher in lowest position, wheels locked, appropriate side rails in place/Call bell, personal items and telephone in reach/Instruct patient to call for assistance before getting out of bed/chair/stretcher/Non-slip footwear applied when patient is off stretcher/[place] to call system/Physically safe environment - no spills, clutter or unnecessary equipment/Purposeful proactive rounding/Room/bathroom lighting operational, light cord in reach

## 2024-10-17 NOTE — PATIENT PROFILE ADULT - NSPROGENOTHERPROVIDER_GEN_A_NUR
Nutrition Assessment     Type and Reason for Visit: Initial (LOS)    Nutrition Recommendations/Plan:   Continue Regular diet. Malnutrition Assessment:  Malnutrition Status: No malnutrition    Nutrition Assessment:  LOS. Pt with PMH of ESRD on HD and drug abuse, who presented with possible seizure. Pt has been eating well since admission, >75% at most meals on Regular diet. Labs reviewed and WNL, no need for diet restrictions at this time. No further nutrition interventions needed. Nutrition Related Findings:   +1 BLE edema; BM 10/5 Wound Type: None    Current Nutrition Therapies:    ADULT DIET;  Regular    Anthropometric Measures:  Height: 5' 8\" (172.7 cm)  Current Body Wt: 164 lb (74.4 kg)   BMI: 24.9    Nutrition Diagnosis:   No nutrition diagnosis at this time     Nutrition Interventions:   Food and/or Nutrient Delivery: Continue Current Diet  Nutrition Education/Counseling: No recommendation at this time  Coordination of Nutrition Care: Continue to monitor while inpatient     Planning:    No discharge needs at this time     Eliot Duvall RD, LD  Contact: [phone] none

## 2024-10-17 NOTE — H&P ADULT - ATTENDING COMMENTS
HPI as above.  Interval history: Pt seen and examined at bedside. No cp or sob.   Vital Signs (24 Hrs):  T(C): 36.7 (10-17-24 @ 15:56), Max: 38.5 (10-16-24 @ 21:31)  HR: 101 (10-17-24 @ 15:56) (101 - 119)  BP: 135/65 (10-17-24 @ 15:56) (106/66 - 144/76)  RR: 18 (10-17-24 @ 15:56) (18 - 19)  SpO2: 100% (10-17-24 @ 15:56) (97% - 100%)  Wt(kg): --  Daily     Daily     I&O's Summary    PHYSICAL EXAM:  GENERAL: NAD, well-developed  HEAD:  Atraumatic, Normocephalic  EYES: EOMI, PERRLA, conjunctiva and sclera clear  NECK: Supple, No JVD  CHEST/LUNG: Clear to auscultation bilaterally; No wheeze  HEART: Regular rate and rhythm; No murmurs, rubs, or gallops  ABDOMEN: Soft, Nontender, Nondistended; Bowel sounds present  EXTREMITIES:  2+ Peripheral Pulses, No clubbing, cyanosis, or edema  PSYCH: AAOx3  NEUROLOGY: non-focal  SKIN: No rashes or lesions  Labs reviewed  Imaging reviewed independently and reviewed read  EKG reviewed independently and reviewed read    Plan  89-year-old non verbal female with a past medical history of Parkinson's, dementia, h/o GI bleed, hypertension, hyperlipidemia, history of epilepsy, CVA, and history of schizophrenia coming to the ED from [facility] for evaluation of fever that began today. She apparently was satting low 50s when EMS arrived. Labs : WBC 21K, Hb 9.4, BUn/Cr 24/0.8, VBG lactate 3.5 -->2.6, UA +, RVP neg. CXR : interstitial infiltrates. Admitted to medicine for sepsis 2/2 UTI.     #Septic on admission 2/2 Acute pyelonephritis   - No previous cultures   - NO pneumonia on Cxr   - F/U Ucx  -  change antibiotics to CTX 1 grm daily , follow up cultures  - ID consult appreciated  - Trend lactate, trending down  - Pt still spiking fevers - tylenol prn  - IVF LR @75  - F/U procal and blood cultures    #rest as above  #Progress Note Handoff  Pending (specify):  clincial improvemen  Family discussion: house staff updated pt family  Disposition: snf  Decision to admit the pt is based on acuity as above

## 2024-10-17 NOTE — ED PROVIDER NOTE - CLINICAL SUMMARY MEDICAL DECISION MAKING FREE TEXT BOX
89-year-old female with a past medical history of Parkinson's, dementia, hypertension, hyperlipidemia, history of epilepsy, CVA, and history of schizophrenia presents to the ED from [facility] for evaluation of fever that began today associated with patient being "hypoxic to 50%. patient noted to be DNH however family rescinded. patient febrile, nonverbal. treated with broad spectrum abx, iv fluids. xray chest independently interpreted by me Dr. Morton showing no focal opacities. UA with UTI. admitted for further management.

## 2024-10-17 NOTE — PATIENT PROFILE ADULT - FUNCTIONAL ASSESSMENT - BASIC MOBILITY 6.
1-calculated by average/Not able to assess (calculate score using [facility] averaging method)

## 2024-10-17 NOTE — H&P ADULT - NSHPPHYSICALEXAM_GEN_ALL_CORE
GENERAL: NAD  HEART: Regular rate and rhythm  LUNGS: Unlabored respirations  ABDOMEN: Soft, nontender, nondistended, +BS  EXTREMITIES: 2+ peripheral pulses bilaterally. No clubbing, cyanosis, or edema  NERVOUS SYSTEM:  A&Ox0

## 2024-10-17 NOTE — GOALS OF CARE CONVERSATION - ADVANCED CARE PLANNING - CONVERSATION DETAILS
Goals of care conversation was conducted with patient's daughter. I explained to the daughter the process of intubation and resuscitation  in detail ( in the situation that patient has a cardiac arrest while in the hospital). Patient's daughter requested that patient be DNR & DNI, and all questions were answered in detail.  MOLST form was completed and witnessed by attending .

## 2024-10-18 LAB
ALBUMIN SERPL ELPH-MCNC: 3.6 G/DL — SIGNIFICANT CHANGE UP (ref 3.5–5.2)
ALP SERPL-CCNC: 89 U/L — SIGNIFICANT CHANGE UP (ref 30–115)
ALT FLD-CCNC: <5 U/L — SIGNIFICANT CHANGE UP (ref 0–41)
ANION GAP SERPL CALC-SCNC: 8 MMOL/L — SIGNIFICANT CHANGE UP (ref 7–14)
AST SERPL-CCNC: 12 U/L — SIGNIFICANT CHANGE UP (ref 0–41)
BASOPHILS # BLD AUTO: 0.03 K/UL — SIGNIFICANT CHANGE UP (ref 0–0.2)
BASOPHILS NFR BLD AUTO: 0.2 % — SIGNIFICANT CHANGE UP (ref 0–1)
BILIRUB SERPL-MCNC: 0.3 MG/DL — SIGNIFICANT CHANGE UP (ref 0.2–1.2)
BUN SERPL-MCNC: 12 MG/DL — SIGNIFICANT CHANGE UP (ref 10–20)
CALCIUM SERPL-MCNC: 9.1 MG/DL — SIGNIFICANT CHANGE UP (ref 8.4–10.5)
CHLORIDE SERPL-SCNC: 103 MMOL/L — SIGNIFICANT CHANGE UP (ref 98–110)
CO2 SERPL-SCNC: 29 MMOL/L — SIGNIFICANT CHANGE UP (ref 17–32)
CREAT SERPL-MCNC: 0.6 MG/DL — LOW (ref 0.7–1.5)
D DIMER BLD IA.RAPID-MCNC: 373 NG/ML DDU — HIGH
EGFR: 86 ML/MIN/1.73M2 — SIGNIFICANT CHANGE UP
EOSINOPHIL # BLD AUTO: 0.07 K/UL — SIGNIFICANT CHANGE UP (ref 0–0.7)
EOSINOPHIL NFR BLD AUTO: 0.5 % — SIGNIFICANT CHANGE UP (ref 0–8)
GLUCOSE SERPL-MCNC: 142 MG/DL — HIGH (ref 70–99)
HCT VFR BLD CALC: 28.9 % — LOW (ref 37–47)
HGB BLD-MCNC: 8.7 G/DL — LOW (ref 12–16)
IMM GRANULOCYTES NFR BLD AUTO: 0.4 % — HIGH (ref 0.1–0.3)
LYMPHOCYTES # BLD AUTO: 1.98 K/UL — SIGNIFICANT CHANGE UP (ref 1.2–3.4)
LYMPHOCYTES # BLD AUTO: 14.7 % — LOW (ref 20.5–51.1)
MAGNESIUM SERPL-MCNC: 1.6 MG/DL — LOW (ref 1.8–2.4)
MCHC RBC-ENTMCNC: 22.1 PG — LOW (ref 27–31)
MCHC RBC-ENTMCNC: 30.1 G/DL — LOW (ref 32–37)
MCV RBC AUTO: 73.5 FL — LOW (ref 81–99)
MONOCYTES # BLD AUTO: 1 K/UL — HIGH (ref 0.1–0.6)
MONOCYTES NFR BLD AUTO: 7.4 % — SIGNIFICANT CHANGE UP (ref 1.7–9.3)
NEUTROPHILS # BLD AUTO: 10.31 K/UL — HIGH (ref 1.4–6.5)
NEUTROPHILS NFR BLD AUTO: 76.8 % — HIGH (ref 42.2–75.2)
NRBC # BLD: 0 /100 WBCS — SIGNIFICANT CHANGE UP (ref 0–0)
PLATELET # BLD AUTO: 207 K/UL — SIGNIFICANT CHANGE UP (ref 130–400)
PMV BLD: 10.8 FL — HIGH (ref 7.4–10.4)
POTASSIUM SERPL-MCNC: 4.2 MMOL/L — SIGNIFICANT CHANGE UP (ref 3.5–5)
POTASSIUM SERPL-SCNC: 4.2 MMOL/L — SIGNIFICANT CHANGE UP (ref 3.5–5)
PROCALCITONIN SERPL-MCNC: 3.72 NG/ML — HIGH (ref 0.02–0.1)
PROT SERPL-MCNC: 6.3 G/DL — SIGNIFICANT CHANGE UP (ref 6–8)
RBC # BLD: 3.93 M/UL — LOW (ref 4.2–5.4)
RBC # FLD: 19 % — HIGH (ref 11.5–14.5)
SODIUM SERPL-SCNC: 140 MMOL/L — SIGNIFICANT CHANGE UP (ref 135–146)
WBC # BLD: 13.45 K/UL — HIGH (ref 4.8–10.8)
WBC # FLD AUTO: 13.45 K/UL — HIGH (ref 4.8–10.8)

## 2024-10-18 PROCEDURE — 93970 EXTREMITY STUDY: CPT | Mod: 26

## 2024-10-18 PROCEDURE — 71045 X-RAY EXAM CHEST 1 VIEW: CPT | Mod: 26,77

## 2024-10-18 PROCEDURE — 99232 SBSQ HOSP IP/OBS MODERATE 35: CPT

## 2024-10-18 PROCEDURE — 71045 X-RAY EXAM CHEST 1 VIEW: CPT | Mod: 26

## 2024-10-18 RX ORDER — MAGNESIUM SULFATE IN 0.9% NACL 2 G/50 ML
2 INTRAVENOUS SOLUTION, PIGGYBACK (ML) INTRAVENOUS ONCE
Refills: 0 | Status: COMPLETED | OUTPATIENT
Start: 2024-10-18 | End: 2024-10-18

## 2024-10-18 RX ADMIN — Medication 40 MILLIGRAM(S): at 13:17

## 2024-10-18 RX ADMIN — Medication 100 MILLIGRAM(S): at 12:10

## 2024-10-18 RX ADMIN — Medication 75 MILLILITER(S): at 12:13

## 2024-10-18 RX ADMIN — LEVETIRACETAM 1000 MILLIGRAM(S): 500 TABLET, FILM COATED ORAL at 05:00

## 2024-10-18 RX ADMIN — LEVETIRACETAM 1000 MILLIGRAM(S): 500 TABLET, FILM COATED ORAL at 17:43

## 2024-10-18 RX ADMIN — Medication 0.5 TABLET(S): at 13:18

## 2024-10-18 RX ADMIN — Medication 650 MILLIGRAM(S): at 12:11

## 2024-10-18 RX ADMIN — Medication 0.5 MILLIGRAM(S): at 17:43

## 2024-10-18 RX ADMIN — Medication 0.5 TABLET(S): at 05:01

## 2024-10-18 RX ADMIN — Medication 0.5 TABLET(S): at 21:25

## 2024-10-18 RX ADMIN — Medication 25 GRAM(S): at 13:38

## 2024-10-18 RX ADMIN — Medication 650 MILLIGRAM(S): at 12:51

## 2024-10-18 NOTE — SWALLOW BEDSIDE ASSESSMENT ADULT - DIET PRIOR TO ADMI
puree, thin liquids w/ soft snacks per NH documentation
puree, thin liquids w/ soft snacks per NH documentation

## 2024-10-18 NOTE — PROGRESS NOTE ADULT - ASSESSMENT
89-year-old non verbal female with a past medical history of Parkinson's, dementia, h/o GI bleed, hypertension, hyperlipidemia, history of epilepsy, CVA, and history of schizophrenia coming to the ED from [facility] for evaluation of fever that began today. She apparently was satting low 50s when EMS arrived. Labs : WBC 21K, Hb 9.4, BUn/Cr 24/0.8, VBG lactate 3.5 -->2.6, UA +, RVP neg. CXR : interstitial infiltrates. Admitted to medicine for sepsis 2/2 UTI.     #Septic on admission   #UTI   #CXR with B/L infiltrates  - s/p Cefepime and levoflox   - c/w rocephin for now as per ID recs  - Trend WBC  -  lactate   - IVF LR @75  - F/U procal, blood cultures, urine cultures    #SOB  - Tacyhycardic- 105  - D- dimer and LE duplex ordered  - f/u results    #PD   #Dementia   - C/W sinamet, benztropine     #HTN   #HLD  - C/W home Meds    #CVA   #Epilepsy  #Schizophrenia   - C/W home meds     #MISC   - Diet - diet per S/S  - DVT - Lovenox  - GI prophy - PPI  - Activity - wheelchair bound    89-year-old non verbal female with a past medical history of Parkinson's, dementia, h/o GI bleed, hypertension, hyperlipidemia, history of epilepsy, CVA, and history of schizophrenia coming to the ED from [facility] for evaluation of fever that began today. She apparently was satting low 50s when EMS arrived. Labs : WBC 21K, Hb 9.4, BUn/Cr 24/0.8, VBG lactate 3.5 -->2.6, UA +, RVP neg. CXR : interstitial infiltrates. Admitted to medicine for sepsis 2/2 UTI.     #Septic on admission 2/2 Acute pyelonephritis   - No previous cultures   - NO pneumonia on Cxr   - F/U Ucx  -  change antibiotics to CTX 1 grm daily , follow up cultures  - ID consult appreciated  - Trend lactate, trending down  - Pt still spiking fevers - tylenol prn  - IVF LR @75  - F/U procal and blood cultures    #sinus tach likely 2/2 sepsis   - on minimal o2, cxr neg   - dimer 300s, duplex neg   -continue IVF   - repeat CXR     #PD   #Dementia   - C/W sinamet, benztropine     #HTN   #HLD  - C/W home Meds    #CVA   #Epilepsy  #Schizophrenia   - C/W home meds     #MISC   - Diet - diet per S/S  - DVT - Lovenox  - GI prophy - PPI  - Activity - wheelchair bound

## 2024-10-18 NOTE — PROGRESS NOTE ADULT - ATTENDING COMMENTS
HPI as above.  Interval history: Pt seen and examined at bedside. unable to get ROS   Vital Signs (24 Hrs):  T(C): 37.1 (10-18-24 @ 11:59), Max: 37.2 (10-18-24 @ 03:45)  HR: 108 (10-18-24 @ 11:59) (100 - 108)  BP: 133/79 (10-18-24 @ 11:59) (119/72 - 135/65)  RR: 18 (10-18-24 @ 11:59) (18 - 19)  SpO2: 100% (10-18-24 @ 11:59) (91% - 100%)  Wt(kg): --  Daily     Daily     I&O's Summary    17 Oct 2024 07:01  -  18 Oct 2024 07:00  --------------------------------------------------------  IN: 300 mL / OUT: 0 mL / NET: 300 mL      PHYSICAL EXAM:  GENERAL: NAD, elderly   HEAD:  Atraumatic, Normocephalic  EYES: EOMI, PERRLA, conjunctiva and sclera clear  NECK: Supple, No JVD  CHEST/LUNG: Clear to auscultation bilaterally; No wheeze  HEART: Regular rate and rhythm; No murmurs, rubs, or gallops  ABDOMEN: Soft, Nontender, Nondistended; Bowel sounds present  EXTREMITIES:  2+ Peripheral Pulses, No clubbing, cyanosis, or edema  PSYCH: opens eyes  NEUROLOGY: non-focal  SKIN: No rashes or lesions  Labs reviewed  Imaging reviewed independently and reviewed read  EKG reviewed independently and reviewed read    Plan as above. DW resident. Agree to plan. Made edits    #Progress Note Handoff  Pending (specify):  follow up cxr, cultures   Family discussion: house staff updated pt family  Disposition: snf   Decision to admit the pt is based on acuity as above

## 2024-10-18 NOTE — SWALLOW BEDSIDE ASSESSMENT ADULT - SLP PERTINENT HISTORY OF CURRENT PROBLEM
Pt is an 89-year-old non verbal female with a past medical history of Parkinson's, dementia, GI bleed, hypertension, hyperlipidemia, history of epilepsy, CVA, and history of schizophrenia coming to the ED from [facility] for evaluation of fever. +Sepsis, UTI,  Infection w/ leukocytosis. CXR (-).
Pt is an 89-year-old non verbal female with a past medical history of Parkinson's, dementia, GI bleed, hypertension, hyperlipidemia, history of epilepsy, CVA, and history of schizophrenia coming to the ED from [facility] for evaluation of fever. +Sepsis, UTI,  Infection w/ leukocytosis. CXR (-).

## 2024-10-18 NOTE — SWALLOW BEDSIDE ASSESSMENT ADULT - SWALLOW EVAL: RECOMMENDED FEEDING/EATING TECHNIQUES
oral hygiene/position upright (90 degrees)/small sips/bites
oral hygiene/position upright (90 degrees)/small sips/bites

## 2024-10-18 NOTE — PROGRESS NOTE ADULT - ASSESSMENT
ASSESSMENT  89yFemale with a PMH of Parkinson's, dementia, GI bleed, hypertension, hyperlipidemia, history of epilepsy, CVA, and history of schizophrenia coming to the ED from [facility] for evaluation of fever and hypoxia, reportedly satting low 50s when EMS arrived. Patient is O2 sat is 94% in the ED, placed on 2L of O2 and satting at 98%. History limited as pt is nonverbal. No acute overnight events reported by the nurse. Pt seen bedside, awake and responds to stimuli, on premafit w/o urinary retention, no recent bowel movements w/ BS present, no vomiting, no recent fever, no longer on supplemental O2, satting at 98% on room air.     IMPRESSION/RECOMMENDATIONS  Immunosuppression/Immunosenescence ( above age 60 yrs there is a exponential decline in immunity which could result in poor clinical outcomes.   Resolved sepsis   Acute pyelonephritis with no obstructive uropathy   10/17 UCx GNRs  10/17 BCx NG  Lactate 3.5 on admission> 2.2  WBC 21.2 > 13.4  Toxic encephaolpathy  COVID 19/ Influenza/ RSV NG.     -f/u GNRs in UCX-monitor WBC  -Rocephin 1 gm iv q24h .

## 2024-10-18 NOTE — SWALLOW BEDSIDE ASSESSMENT ADULT - SLP GENERAL OBSERVATIONS
pt received in bed awake alert appears pleasantly confused +room air +no verbal output
pt received in bed awake alert appears pleasantly confused +room air +no verbal output, baseline cough

## 2024-10-18 NOTE — SWALLOW BEDSIDE ASSESSMENT ADULT - SWALLOW EVAL: DIAGNOSIS
+toleration for thin liquids and puree w/o overt s/s aspiration/penetration. Baseline cough unchanged w/ PO intake
+toleration for thin liquids, mildly thick liquids, and puree w/o overt s/s aspiration/penetration.

## 2024-10-19 LAB
ALBUMIN SERPL ELPH-MCNC: 3.2 G/DL — LOW (ref 3.5–5.2)
ALP SERPL-CCNC: 80 U/L — SIGNIFICANT CHANGE UP (ref 30–115)
ALT FLD-CCNC: <5 U/L — SIGNIFICANT CHANGE UP (ref 0–41)
ANION GAP SERPL CALC-SCNC: 9 MMOL/L — SIGNIFICANT CHANGE UP (ref 7–14)
AST SERPL-CCNC: 13 U/L — SIGNIFICANT CHANGE UP (ref 0–41)
BASOPHILS # BLD AUTO: 0.01 K/UL — SIGNIFICANT CHANGE UP (ref 0–0.2)
BASOPHILS NFR BLD AUTO: 0.1 % — SIGNIFICANT CHANGE UP (ref 0–1)
BILIRUB SERPL-MCNC: 0.2 MG/DL — SIGNIFICANT CHANGE UP (ref 0.2–1.2)
BUN SERPL-MCNC: 11 MG/DL — SIGNIFICANT CHANGE UP (ref 10–20)
CALCIUM SERPL-MCNC: 8.8 MG/DL — SIGNIFICANT CHANGE UP (ref 8.4–10.4)
CHLORIDE SERPL-SCNC: 104 MMOL/L — SIGNIFICANT CHANGE UP (ref 98–110)
CO2 SERPL-SCNC: 29 MMOL/L — SIGNIFICANT CHANGE UP (ref 17–32)
CREAT SERPL-MCNC: 0.6 MG/DL — LOW (ref 0.7–1.5)
EGFR: 86 ML/MIN/1.73M2 — SIGNIFICANT CHANGE UP
EOSINOPHIL # BLD AUTO: 0.09 K/UL — SIGNIFICANT CHANGE UP (ref 0–0.7)
EOSINOPHIL NFR BLD AUTO: 0.8 % — SIGNIFICANT CHANGE UP (ref 0–8)
GLUCOSE SERPL-MCNC: 122 MG/DL — HIGH (ref 70–99)
HCT VFR BLD CALC: 26.9 % — LOW (ref 37–47)
HGB BLD-MCNC: 7.9 G/DL — LOW (ref 12–16)
IMM GRANULOCYTES NFR BLD AUTO: 0.5 % — HIGH (ref 0.1–0.3)
LYMPHOCYTES # BLD AUTO: 2.2 K/UL — SIGNIFICANT CHANGE UP (ref 1.2–3.4)
LYMPHOCYTES # BLD AUTO: 20.6 % — SIGNIFICANT CHANGE UP (ref 20.5–51.1)
MAGNESIUM SERPL-MCNC: 2.2 MG/DL — SIGNIFICANT CHANGE UP (ref 1.8–2.4)
MCHC RBC-ENTMCNC: 21.7 PG — LOW (ref 27–31)
MCHC RBC-ENTMCNC: 29.4 G/DL — LOW (ref 32–37)
MCV RBC AUTO: 73.9 FL — LOW (ref 81–99)
MONOCYTES # BLD AUTO: 0.82 K/UL — HIGH (ref 0.1–0.6)
MONOCYTES NFR BLD AUTO: 7.7 % — SIGNIFICANT CHANGE UP (ref 1.7–9.3)
NEUTROPHILS # BLD AUTO: 7.51 K/UL — HIGH (ref 1.4–6.5)
NEUTROPHILS NFR BLD AUTO: 70.3 % — SIGNIFICANT CHANGE UP (ref 42.2–75.2)
NRBC # BLD: 0 /100 WBCS — SIGNIFICANT CHANGE UP (ref 0–0)
PLATELET # BLD AUTO: 206 K/UL — SIGNIFICANT CHANGE UP (ref 130–400)
PMV BLD: 11.1 FL — HIGH (ref 7.4–10.4)
POTASSIUM SERPL-MCNC: 4.6 MMOL/L — SIGNIFICANT CHANGE UP (ref 3.5–5)
POTASSIUM SERPL-SCNC: 4.6 MMOL/L — SIGNIFICANT CHANGE UP (ref 3.5–5)
PROT SERPL-MCNC: 5.9 G/DL — LOW (ref 6–8)
RBC # BLD: 3.64 M/UL — LOW (ref 4.2–5.4)
RBC # FLD: 19.2 % — HIGH (ref 11.5–14.5)
SODIUM SERPL-SCNC: 142 MMOL/L — SIGNIFICANT CHANGE UP (ref 135–146)
WBC # BLD: 10.68 K/UL — SIGNIFICANT CHANGE UP (ref 4.8–10.8)
WBC # FLD AUTO: 10.68 K/UL — SIGNIFICANT CHANGE UP (ref 4.8–10.8)

## 2024-10-19 PROCEDURE — 99232 SBSQ HOSP IP/OBS MODERATE 35: CPT

## 2024-10-19 RX ADMIN — Medication 0.5 TABLET(S): at 06:15

## 2024-10-19 RX ADMIN — Medication 0.5 TABLET(S): at 12:27

## 2024-10-19 RX ADMIN — LEVETIRACETAM 1000 MILLIGRAM(S): 500 TABLET, FILM COATED ORAL at 18:00

## 2024-10-19 RX ADMIN — Medication 0.5 TABLET(S): at 22:51

## 2024-10-19 RX ADMIN — Medication 40 MILLIGRAM(S): at 12:27

## 2024-10-19 RX ADMIN — Medication 100 MILLIGRAM(S): at 12:28

## 2024-10-19 RX ADMIN — LEVETIRACETAM 1000 MILLIGRAM(S): 500 TABLET, FILM COATED ORAL at 06:13

## 2024-10-19 RX ADMIN — Medication 0.5 MILLIGRAM(S): at 12:27

## 2024-10-19 NOTE — DIETITIAN INITIAL EVALUATION ADULT - NSFNSGIIOFT_GEN_A_CORE
Dx: 89y/o female with h/o Parkinson's, dementia, GI bleed, HTN, HLD, epilepsy, CVA and schizophrenia, presented to the ED from [facility] for evaluation of fever. Admitted to medicine for sepsis secondary to UTI and acute pyelonephritis secondary to GN bacteria.

## 2024-10-19 NOTE — DIETITIAN INITIAL EVALUATION ADULT - NAME AND PHONE
Intervention: 1.Meals and Snacks 2.Medical Food Supplement 3.Coordination of Care  Monitor/Evaluate: Diet order, energy intake, nutrition focused physical findings, anemia profile

## 2024-10-19 NOTE — DIETITIAN INITIAL EVALUATION ADULT - ORAL INTAKE PTA/DIET HISTORY
An interview with the patient could not be completed at this time since they are asleep. I spoke to the patient's daughter name Ansley via telephone [phone]. Per daughter, prior to admission, the patient resided at [facility]; received a pureed diet, vanilla and chocolate ensure three times daily; consumed 100% meals and oral supplement. Per daughter, the patient does not tolerate thin liquids but requires thickened liquids. Allergic to seafood. Denies weight loss.

## 2024-10-19 NOTE — DIETITIAN INITIAL EVALUATION ADULT - PERTINENT LABORATORY DATA
10-19    142  |  104  |  11  ----------------------------<  122[H]  4.6   |  29  |  0.6[L]    Ca    8.8      19 Oct 2024 04:30  Mg     2.2     10-19    TPro  5.9[L]  /  Alb  3.2[L]  /  TBili  0.2  /  DBili  x   /  AST  13  /  ALT  <5  /  AlkPhos  80  10-19  A1C with Estimated Average Glucose Result: 6.7 % (10-17-24 @ 11:17)

## 2024-10-19 NOTE — DIETITIAN INITIAL EVALUATION ADULT - INCREASED NUTRIENT NEEDS
Neel Gutierrez MD   aspirin 81 MG EC tablet Take 1 tablet by mouth daily 8/18/16   Nany Vargas MD     Aspirin  [] 81 mg  [] 325 mg  [x] None  Antiplatelet drug therapy use last 5 days  [x]No []Yes  Coumadin Use Last 5 Days [x]No []Yes  Other anticoagulant use last 5 days  [x]No []Yes  Additional information: Per medical records       Vitals:   Vitals:    03/20/23 0601   BP: 136/74   Pulse:    Resp:    Temp:    SpO2:        Physical Examination:   GENERAL: Alert and oriented. No distress. EYES: No pallor or icterus. ENT: No central cyanosis. VESSELS: No jugular venous distension or carotid bruits. HEART: Normal S1/S2. No murmur, rub or gallop. LUNGS: Clear to auscultation. CHEST WALL: No erosions, discharge or swelling at device site (lateral lateral chest wall)  ABDOMEN: Soft and non-tender. EXTREMITIES: No lower extremity edema. Feet are warm. NEUROLOGICAL: Grossly intact. Procedure Plannd:   [] AF ablation [] Atrial Flutter ablation [] SVT ablation [] PVC/VT ablation [] EP study  [] Pacemaker implantation [] AICD implantation [] BiV PM/ICD implantation   [x] S-ICD Generator change [] Loop recorder implantation [] Loop recorder explantation. [] Micra leadless pacemaker implantation. [] His bundle/Left bundle pacemaker implantation. [] Lead revision. [] Pocket Revision. [] TACHO. [] Cardioversion    []Other:       Planned Sedation/Analgesia:  [x] General anesthesia.   [] Midazolam [] Fentanyl [] Propofol [] Propofol with anesthesia team.    []Midazolam []Meperidine []Sublimaze []Morphine [] Diazepam    []Other:             Claudean Shawl, MD MD Idell Lat  Electronically signed 3/20/2023 at 7:10 AM
Calories and protein needs

## 2024-10-19 NOTE — DIETITIAN INITIAL EVALUATION ADULT - OTHER CALCULATIONS
Estimated Calorie Needs: [id number] x AF 1.3-1.7=[id number]/day -Due to possible PCM  Estimated Protein Needs: 70-80grams/day (1.3-1.5grams/kg of admit weight) -Due to age, possible PCM and h/o CVA  Estimated Fluid Needs: 1217-1310mL/day (1mL/kcal)

## 2024-10-19 NOTE — PROGRESS NOTE ADULT - ASSESSMENT
89-year-old non verbal female with a past medical history of Parkinson's, dementia, h/o GI bleed, hypertension, hyperlipidemia, history of epilepsy, CVA, and history of schizophrenia coming to the ED from [facility] for evaluation of fever that began today. She apparently was satting low 50s when EMS arrived. Labs : WBC 21K, Hb 9.4, BUn/Cr 24/0.8, VBG lactate 3.5 -->2.6, UA +, RVP neg. CXR : interstitial infiltrates. Admitted to medicine for sepsis 2/2 UTI.     #Septic on admission 2/2 Acute pyelonephritis  2/2 GN bacteria   - No previous cultures   - NO pneumonia on Cxr   - F/U Ucx  -  change antibiotics to CTX 1 grm daily , follow up cultures  - ID consult appreciated  - Trend lactate, trending down  - Pt still spiking fevers - tylenol prn  -  DC IVF   - F/U procal and blood cultures    #sinus tach likely 2/2 sepsis   - on minimal o2, cxr neg   - dimer 300s, duplex neg   - DC IVF   - repeat CXR   - improving     #PD   #Dementia   - C/W sinamet, benztropine     #HTN   #HLD  - C/W home Meds    #CVA   #Epilepsy  #Schizophrenia   - C/W home meds     #Progress Note Handoff  Pending (specify):  follow up culutrees  Family discussion: house staff updated pt family  Disposition: SNF  Decision to admit the pt is based on acuity as above

## 2024-10-19 NOTE — DIETITIAN INITIAL EVALUATION ADULT - REASON
A comprehensive nutrition focused physical examination could not be completed at this time since the patient is asleep. However, at a glance, the patient displays signs of moderate fat loss located at the periorbital region.

## 2024-10-19 NOTE — DIETITIAN INITIAL EVALUATION ADULT - PERTINENT MEDS FT
MEDICATIONS  (STANDING):  benztropine 0.5 milliGRAM(s) Oral daily  carbidopa/levodopa  25/100 0.5 Tablet(s) Oral every 8 hours  cefTRIAXone   IVPB 1000 milliGRAM(s) IV Intermittent every 24 hours  enoxaparin Injectable 40 milliGRAM(s) SubCutaneous every 24 hours  lactated ringers. 1000 milliLiter(s) (75 mL/Hr) IV Continuous <Continuous>  levETIRAcetam  Solution 1000 milliGRAM(s) Oral two times a day    MEDICATIONS  (PRN):  acetaminophen     Tablet .. 650 milliGRAM(s) Oral every 6 hours PRN Temp greater or equal to 38C (100.4F), Moderate Pain (4 - 6)  aluminum hydroxide/magnesium hydroxide/simethicone Suspension 30 milliLiter(s) Oral every 4 hours PRN Dyspepsia  melatonin 3 milliGRAM(s) Oral at bedtime PRN Insomnia  ondansetron Injectable 4 milliGRAM(s) IV Push every 8 hours PRN Nausea and/or Vomiting

## 2024-10-19 NOTE — DIETITIAN INITIAL EVALUATION ADULT - NSFNSNUTRCHEWSWALLOWFT_GEN_A_CORE
S/p swallow evaluation (10/17), recs include provide a pureed diet with thin liquids, which was taken.

## 2024-10-20 VITALS — RESPIRATION RATE: 20 BRPM | OXYGEN SATURATION: 87 %

## 2024-10-20 LAB
ALBUMIN SERPL ELPH-MCNC: 3.4 G/DL — LOW (ref 3.5–5.2)
ALP SERPL-CCNC: 81 U/L — SIGNIFICANT CHANGE UP (ref 30–115)
ALT FLD-CCNC: <5 U/L — SIGNIFICANT CHANGE UP (ref 0–41)
ANION GAP SERPL CALC-SCNC: 8 MMOL/L — SIGNIFICANT CHANGE UP (ref 7–14)
AST SERPL-CCNC: 11 U/L — SIGNIFICANT CHANGE UP (ref 0–41)
BASOPHILS # BLD AUTO: 0.02 K/UL — SIGNIFICANT CHANGE UP (ref 0–0.2)
BASOPHILS NFR BLD AUTO: 0.2 % — SIGNIFICANT CHANGE UP (ref 0–1)
BILIRUB SERPL-MCNC: 0.3 MG/DL — SIGNIFICANT CHANGE UP (ref 0.2–1.2)
BUN SERPL-MCNC: 10 MG/DL — SIGNIFICANT CHANGE UP (ref 10–20)
CALCIUM SERPL-MCNC: 9.1 MG/DL — SIGNIFICANT CHANGE UP (ref 8.4–10.5)
CHLORIDE SERPL-SCNC: 102 MMOL/L — SIGNIFICANT CHANGE UP (ref 98–110)
CO2 SERPL-SCNC: 30 MMOL/L — SIGNIFICANT CHANGE UP (ref 17–32)
CREAT SERPL-MCNC: 0.5 MG/DL — LOW (ref 0.7–1.5)
EGFR: 90 ML/MIN/1.73M2 — SIGNIFICANT CHANGE UP
EOSINOPHIL # BLD AUTO: 0.16 K/UL — SIGNIFICANT CHANGE UP (ref 0–0.7)
EOSINOPHIL NFR BLD AUTO: 1.7 % — SIGNIFICANT CHANGE UP (ref 0–8)
GLUCOSE SERPL-MCNC: 141 MG/DL — HIGH (ref 70–99)
HCT VFR BLD CALC: 28 % — LOW (ref 37–47)
HGB BLD-MCNC: 8.3 G/DL — LOW (ref 12–16)
IMM GRANULOCYTES NFR BLD AUTO: 0.6 % — HIGH (ref 0.1–0.3)
LYMPHOCYTES # BLD AUTO: 2.01 K/UL — SIGNIFICANT CHANGE UP (ref 1.2–3.4)
LYMPHOCYTES # BLD AUTO: 21.5 % — SIGNIFICANT CHANGE UP (ref 20.5–51.1)
MCHC RBC-ENTMCNC: 22 PG — LOW (ref 27–31)
MCHC RBC-ENTMCNC: 29.6 G/DL — LOW (ref 32–37)
MCV RBC AUTO: 74.1 FL — LOW (ref 81–99)
MONOCYTES # BLD AUTO: 0.79 K/UL — HIGH (ref 0.1–0.6)
MONOCYTES NFR BLD AUTO: 8.5 % — SIGNIFICANT CHANGE UP (ref 1.7–9.3)
NEUTROPHILS # BLD AUTO: 6.29 K/UL — SIGNIFICANT CHANGE UP (ref 1.4–6.5)
NEUTROPHILS NFR BLD AUTO: 67.5 % — SIGNIFICANT CHANGE UP (ref 42.2–75.2)
NRBC # BLD: 0 /100 WBCS — SIGNIFICANT CHANGE UP (ref 0–0)
PLATELET # BLD AUTO: 230 K/UL — SIGNIFICANT CHANGE UP (ref 130–400)
PMV BLD: 11.1 FL — HIGH (ref 7.4–10.4)
POTASSIUM SERPL-MCNC: 4.2 MMOL/L — SIGNIFICANT CHANGE UP (ref 3.5–5)
POTASSIUM SERPL-SCNC: 4.2 MMOL/L — SIGNIFICANT CHANGE UP (ref 3.5–5)
PROT SERPL-MCNC: 5.9 G/DL — LOW (ref 6–8)
RBC # BLD: 3.78 M/UL — LOW (ref 4.2–5.4)
RBC # FLD: 19.1 % — HIGH (ref 11.5–14.5)
SODIUM SERPL-SCNC: 140 MMOL/L — SIGNIFICANT CHANGE UP (ref 135–146)
WBC # BLD: 9.33 K/UL — SIGNIFICANT CHANGE UP (ref 4.8–10.8)
WBC # FLD AUTO: 9.33 K/UL — SIGNIFICANT CHANGE UP (ref 4.8–10.8)

## 2024-10-20 PROCEDURE — 99239 HOSP IP/OBS DSCHRG MGMT >30: CPT

## 2024-10-20 RX ORDER — CEFPODOXIME PROXETIL 200 MG/1
1 TABLET, FILM COATED ORAL
Qty: 10 | Refills: 0
Start: 2024-10-20 | End: 2024-10-24

## 2024-10-20 RX ORDER — CEFEPIME 2 G/1
1000 INJECTION, POWDER, FOR SOLUTION INTRAVENOUS EVERY 12 HOURS
Refills: 0 | Status: DISCONTINUED | OUTPATIENT
Start: 2024-10-21 | End: 2024-10-20

## 2024-10-20 RX ORDER — CEFEPIME 2 G/1
1000 INJECTION, POWDER, FOR SOLUTION INTRAVENOUS ONCE
Refills: 0 | Status: COMPLETED | OUTPATIENT
Start: 2024-10-20 | End: 2024-10-20

## 2024-10-20 RX ORDER — CEFEPIME 2 G/1
INJECTION, POWDER, FOR SOLUTION INTRAVENOUS
Refills: 0 | Status: DISCONTINUED | OUTPATIENT
Start: 2024-10-20 | End: 2024-10-20

## 2024-10-20 RX ADMIN — Medication 0.5 TABLET(S): at 05:20

## 2024-10-20 RX ADMIN — LEVETIRACETAM 1000 MILLIGRAM(S): 500 TABLET, FILM COATED ORAL at 05:20

## 2024-10-20 RX ADMIN — LEVETIRACETAM 1000 MILLIGRAM(S): 500 TABLET, FILM COATED ORAL at 17:45

## 2024-10-20 RX ADMIN — Medication 40 MILLIGRAM(S): at 12:27

## 2024-10-20 RX ADMIN — Medication 100 MILLIGRAM(S): at 12:27

## 2024-10-20 RX ADMIN — Medication 0.5 TABLET(S): at 12:28

## 2024-10-20 RX ADMIN — CEFEPIME 100 MILLIGRAM(S): 2 INJECTION, POWDER, FOR SOLUTION INTRAVENOUS at 18:07

## 2024-10-20 NOTE — DISCHARGE NOTE PROVIDER - CARE PROVIDER_API CALL
ZAHIRA VERDE  [id number] [address]  Phone: [phone]  Fax: [phone]  Follow Up Time: 1 week

## 2024-10-20 NOTE — DISCHARGE NOTE NURSING/CASE MANAGEMENT/SOCIAL WORK - NSDCPEFALRISK_GEN_ALL_CORE
For information on Fall & Injury Prevention, visit: [email or web address] OR  [email or web address] OR  https://www.cdc.gov/steadi/patient.html

## 2024-10-20 NOTE — DISCHARGE NOTE PROVIDER - NSDCCPCAREPLAN_GEN_ALL_CORE_FT
PRINCIPAL DISCHARGE DIAGNOSIS  Diagnosis: Sepsis due to gram-negative UTI  Assessment and Plan of Treatment: Pt found to have sepsis 2/2 pansesnative providencia.  Continue Vantin 200 mg BID until 10/24    
DISCHARGE

## 2024-10-20 NOTE — DISCHARGE NOTE PROVIDER - ATTENDING DISCHARGE PHYSICAL EXAMINATION:
Attending attestation  Attending DC note  Pt seen and examined at bedside. no cp or sob  vitals, labs, exam stable  Hospital course as above.  Plan dw pt daughter and agreed to plan  Medically cleared for DC. Med recc completed.  [facility] resident. Spent 32 mins on case

## 2024-10-20 NOTE — DISCHARGE NOTE NURSING/CASE MANAGEMENT/SOCIAL WORK - FINANCIAL ASSISTANCE
[facility] provides services at a reduced cost to those who are determined to be eligible through [facility]’s financial assistance program. Information regarding [facility]’s financial assistance program can be found by going to [email or web address] or by calling [phone].

## 2024-10-20 NOTE — DISCHARGE NOTE NURSING/CASE MANAGEMENT/SOCIAL WORK - PATIENT PORTAL LINK FT
You can access the FollowMyHealth Patient Portal offered by [facility] by registering at the following website: [email or web address]. By joining [facility]’s FollowMyHealth portal, you will also be able to view your health information using other applications (apps) compatible with our system.

## 2024-10-20 NOTE — PROGRESS NOTE ADULT - SUBJECTIVE AND OBJECTIVE BOX
Patient is a 89y old  Female who presents with a chief complaint of Sepsis (10-20-24)      Pt seen and examined at bedside. Unable to get ROS      PAST MEDICAL & SURGICAL HISTORY:  Diabetes    Dementia    Hypertension    Schizophrenia    No significant past surgical history        VITAL SIGNS (Last 24 hrs):  T(C): 36.7 (10-20-24 @ 13:00), Max: 36.8 (10-20-24 @ 05:00)  HR: 104 (10-20-24 @ 13:00) (85 - 104)  BP: 124/73 (10-20-24 @ 13:00) (123/80 - 124/73)  RR: 18 (10-20-24 @ 13:00) (18 - 18)  SpO2: 100% (10-20-24 @ 13:00) (100% - 100%)  Wt(kg): --  Daily Height in cm: 160.02 (19 Oct 2024 21:02)    Daily     I&O's Summary    19 Oct 2024 07:01  -  20 Oct 2024 07:00  --------------------------------------------------------  IN: 0 mL / OUT: 400 mL / NET: -400 mL        PHYSICAL EXAM:  GENERAL: NAD, elderly  HEAD:  Atraumatic, Normocephalic  EYES: EOMI, PERRLA, conjunctiva and sclera clear  NECK: Supple, No JVD  CHEST/LUNG: Clear to auscultation bilaterally; No wheeze  HEART: Regular rate and rhythm; No murmurs, rubs, or gallops  ABDOMEN: Soft, Nontender, Nondistended; Bowel sounds present  EXTREMITIES:  2+ Peripheral Pulses, No clubbing, cyanosis, or edema  PSYCH: opens eyes  SKIN: No rashes or lesions    Labs Reviewed  Spoke to patient in regards to abnormal labs.    CBC Full  -  ( 20 Oct 2024 07:36 )  WBC Count : 9.33 K/uL  Hemoglobin : 8.3 g/dL  Hematocrit : 28.0 %  Platelet Count - Automated : 230 K/uL  Mean Cell Volume : 74.1 fL  Mean Cell Hemoglobin : 22.0 pg  Mean Cell Hemoglobin Concentration : 29.6 g/dL  Auto Neutrophil # : 6.29 K/uL  Auto Lymphocyte # : 2.01 K/uL  Auto Monocyte # : 0.79 K/uL  Auto Eosinophil # : 0.16 K/uL  Auto Basophil # : 0.02 K/uL  Auto Neutrophil % : 67.5 %  Auto Lymphocyte % : 21.5 %  Auto Monocyte % : 8.5 %  Auto Eosinophil % : 1.7 %  Auto Basophil % : 0.2 %    BMP:    10-20 @ 07:36    Blood Urea Nitrogen - 10  Calcium - 9.1  Carbond Dioxide - 30  Chloride - 102  Creatinine - 0.5  Glucose - 141  Potassium - 4.2  Sodium - 140      Hemoglobin A1c -   PT/INR - ( 17 Oct 2024 00:10 )   PT: 13.00 sec;   INR: 1.14 ratio         PTT - ( 17 Oct 2024 00:10 )  PTT:29.3 sec  Urine Culture:  10-17 @ 01:27 Urine culture: --    Culture Results:   50,000 - 99,000 CFU/mL Providencia stuartii  <10,000 CFU/ml Normal Urogenital sylvain present  Method Type: GROVER  Organism: Providencia stuartii  Organism Identification: Providencia stuartii  Specimen Source: Catheterized None  10-17 @ 00:10 Urine culture: --    Culture Results:   No growth at 72 Hours  Method Type: --  Organism: --  Organism Identification: --  Specimen Source: .Blood BLOOD        COVID Labs  CRP:    Procalcitonin: 3.72 ng/mL (10-17-24 @ 11:17)    D-Dimer:  373 ng/mL DDU (10-18-24 @ 11:40)            Imaging reviewed independently and reviewed read  < from: Xray Chest 1 View-PORTABLE IMMEDIATE (Xray Chest 1 View-PORTABLE IMMEDIATE .) (10.18.24 @ 16:23) >  Support devices: None.  Cardiac/mediastinum/hilum: Aortic calcifications.  Lung parenchyma/Pleura: No pneumothorax. Right basilar   opacity/atelectasis. Interstitial prominence/pulmonary vascular   congestion.  Skeleton/soft tissues: Calcific tendinosis in the right shoulder.    < end of copied text >    Urinalysis with Rflx Culture (10.17.24 @ 01:27)    Urine Appearance: Clear   Color: Yellow   Specific Gravity: 1.013   pH Urine: 6.0   Protein, Urine: Trace mg/dL   Glucose Qualitative, Urine: Negative mg/dL   Ketone - Urine: Negative mg/dL   Blood, Urine: Non Hemolyzed Trace   Bilirubin: Negative   Urobilinogen: 0.2 mg/dL   Leukocyte Esterase Concentration: Large   Nitrite: Negative          MEDICATIONS  (STANDING):  benztropine 0.5 milliGRAM(s) Oral daily  carbidopa/levodopa  25/100 0.5 Tablet(s) Oral every 8 hours  cefTRIAXone   IVPB 1000 milliGRAM(s) IV Intermittent every 24 hours  enoxaparin Injectable 40 milliGRAM(s) SubCutaneous every 24 hours  levETIRAcetam  Solution 1000 milliGRAM(s) Oral two times a day    MEDICATIONS  (PRN):  acetaminophen     Tablet .. 650 milliGRAM(s) Oral every 6 hours PRN Temp greater or equal to 38C (100.4F), Moderate Pain (4 - 6)  aluminum hydroxide/magnesium hydroxide/simethicone Suspension 30 milliLiter(s) Oral every 4 hours PRN Dyspepsia  melatonin 3 milliGRAM(s) Oral at bedtime PRN Insomnia  ondansetron Injectable 4 milliGRAM(s) IV Push every 8 hours PRN Nausea and/or Vomiting      
Today is hospital day 3d. This morning patient was seen and examined at bedside, Patient was resting comfortably in bed. She doesn't respond to commands and AAOx0. No acute or major events overnight.     HPI:  Patient is a 89-year-old non verbal female with a past medical history of Parkinson's, dementia, GI bleed, hypertension, hyperlipidemia, history of epilepsy, CVA, and history of schizophrenia coming to the ED from [facility] for evaluation of fever that began.    MEDICATIONS  STANDING MEDICATIONS  benztropine 0.5 milliGRAM(s) Oral daily  carbidopa/levodopa  25/100 0.5 Tablet(s) Oral every 8 hours  cefTRIAXone   IVPB 1000 milliGRAM(s) IV Intermittent every 24 hours  enoxaparin Injectable 40 milliGRAM(s) SubCutaneous every 24 hours  lactated ringers. 1000 milliLiter(s) IV Continuous <Continuous>  levETIRAcetam  Solution 1000 milliGRAM(s) Oral two times a day    PRN MEDICATIONS  acetaminophen     Tablet .. 650 milliGRAM(s) Oral every 6 hours PRN  aluminum hydroxide/magnesium hydroxide/simethicone Suspension 30 milliLiter(s) Oral every 4 hours PRN  melatonin 3 milliGRAM(s) Oral at bedtime PRN  ondansetron Injectable 4 milliGRAM(s) IV Push every 8 hours PRN    Vital Signs Last 24 Hrs  T(C): 36.4 (19 Oct 2024 12:29), Max: 37.4 (19 Oct 2024 05:39)  T(F): 97.5 (19 Oct 2024 12:29), Max: 99.3 (19 Oct 2024 05:39)  HR: 100 (19 Oct 2024 12:29) (100 - 109)  BP: 121/59 (19 Oct 2024 12:29) (121/59 - 142/79)  BP(mean): --  RR: 18 (19 Oct 2024 12:29) (18 - 18)  SpO2: 100% (19 Oct 2024 12:29) (100% - 100%)      Physical Exam: GENERAL: NAD  HEART: Regular rate and rhythm  LUNGS: labored respirations, tachycardic and tachypneic on 2L NC oxygen  ABDOMEN: Soft, nontender, nondistended, +BS  EXTREMITIES: 2+ peripheral pulses bilaterally. No clubbing, cyanosis, or edema  NERVOUS SYSTEM:  A&Ox0  SKIN: Intact no ulcers noted        LABS:                          7.9    10.68 )-----------( 206      ( 19 Oct 2024 04:30 )             26.9     10-19    142  |  104  |  11  ----------------------------<  122[H]  4.6   |  29  |  0.6[L]    Ca    8.8      19 Oct 2024 04:30  Mg     2.2     10-19    TPro  5.9[L]  /  Alb  3.2[L]  /  TBili  0.2  /  DBili  x   /  AST  13  /  ALT  <5  /  AlkPhos  80  10-19    LIVER FUNCTIONS - ( 19 Oct 2024 04:30 )  Alb: 3.2 g/dL / Pro: 5.9 g/dL / ALK PHOS: 80 U/L / ALT: <5 U/L / AST: 13 U/L / GGT: x             Urinalysis Basic - ( 19 Oct 2024 04:30 )    Color: x / Appearance: x / SG: x / pH: x  Gluc: 122 mg/dL / Ketone: x  / Bili: x / Urobili: x   Blood: x / Protein: x / Nitrite: x   Leuk Esterase: x / RBC: x / WBC x   Sq Epi: x / Non Sq Epi: x / Bacteria: x      Urinalysis with Rflx Culture (collected 17 Oct 2024 01:27)    Culture - Urine (collected 17 Oct 2024 01:27)  Source: Catheterized None  Preliminary Report (18 Oct 2024 07:43):    50,000 - 99,000 CFU/mL Gram Negative Rods    <10,000 CFU/ml Normal Urogenital sylvain present    Culture - Blood (collected 17 Oct 2024 00:10)  Source: .Blood BLOOD  Preliminary Report (18 Oct 2024 07:01):    No growth at 24 hours    Culture - Blood (collected 17 Oct 2024 00:10)  Source: .Blood BLOOD  Preliminary Report (18 Oct 2024 07:01):    No growth at 24 hours      IMAGING    XR CHEST PORTABLE IMMED 1V     FINDINGS/  IMPRESSION:    Support devices: None.  Cardiac/mediastinum/hilum: Aortic calcifications.  Lung parenchyma/Pleura: No pneumothorax. Right basilar   opacity/atelectasis. Interstitial prominence/pulmonary vascular   congestion.  Skeleton/soft tissues: Calcific tendinosis in the right shoulder.      DUPLEX SCAN EXT VEINS LOWER BI   IMPRESSION:  No evidence of deep venous thrombosis in either lower extremity.            
SUBJECTIVE/OVERNIGHT EVENTS  Today is hospital day 1d. This morning patient was seen and examined at bedside, Patient was tahcycardic but stating well on nasal cannula oxygen, resting comfortably in bed. She doesn't respond to commands and AAOx0. No acute or major events overnight.     HPI:  Patient is a 89-year-old non verbal female with a past medical history of Parkinson's, dementia, GI bleed, hypertension, hyperlipidemia, history of epilepsy, CVA, and history of schizophrenia coming to the ED from [facility] for evaluation of fever that began.    MEDICATIONS  STANDING MEDICATIONS  benztropine 0.5 milliGRAM(s) Oral daily  carbidopa/levodopa  25/100 0.5 Tablet(s) Oral every 8 hours  cefTRIAXone   IVPB 1000 milliGRAM(s) IV Intermittent every 24 hours  enoxaparin Injectable 40 milliGRAM(s) SubCutaneous every 24 hours  lactated ringers. 1000 milliLiter(s) IV Continuous <Continuous>  levETIRAcetam  Solution 1000 milliGRAM(s) Oral two times a day    PRN MEDICATIONS  acetaminophen     Tablet .. 650 milliGRAM(s) Oral every 6 hours PRN  aluminum hydroxide/magnesium hydroxide/simethicone Suspension 30 milliLiter(s) Oral every 4 hours PRN  melatonin 3 milliGRAM(s) Oral at bedtime PRN  ondansetron Injectable 4 milliGRAM(s) IV Push every 8 hours PRN    VITALS  T(F): 98.9 (10-18-24 @ 03:45), Max: 98.9 (10-18-24 @ 03:45)  HR: 100 (10-18-24 @ 08:17) (100 - 105)  BP: 119/72 (10-18-24 @ 03:45) (119/72 - 135/65)  RR: 19 (10-18-24 @ 03:45) (18 - 19)  SpO2: 91% (10-18-24 @ 08:17) (91% - 100%)    Physical Exam: GENERAL: NAD  HEART: Regular rate and rhythm  LUNGS: labored respirations, tachycardic and tachypneic on 2L NC oxygen  ABDOMEN: Soft, nontender, nondistended, +BS  EXTREMITIES: 2+ peripheral pulses bilaterally. No clubbing, cyanosis, or edema  NERVOUS SYSTEM:  A&Ox0  SKIN: Intact no ulcers noted    LABS             8.6    20.16 )-----------( 226      ( 10-17-24 @ 11:17 )             28.7     140  |  102  |  15  -------------------------<  100   10-17-24 @ 11:17  4.1  |  27  |  0.7    Ca      9.5     10-17-24 @ 11:17    TPro  6.4  /  Alb  3.6  /  TBili  0.5  /  DBili  x   /  AST  15  /  ALT  9   /  AlkPhos  94  /  GGT  x     10-17-24 @ 11:17    PT/INR - ( 10-17-24 @ 00:10 )   PT: 13.00 sec[H];   INR: 1.14 ratio  PTT - ( 10-17-24 @ 00:10 )  PTT:29.3 sec      Urinalysis Basic - ( 17 Oct 2024 11:17 )    Color: x / Appearance: x / SG: x / pH: x  Gluc: 100 mg/dL / Ketone: x  / Bili: x / Urobili: x   Blood: x / Protein: x / Nitrite: x   Leuk Esterase: x / RBC: x / WBC x   Sq Epi: x / Non Sq Epi: x / Bacteria: x      Urinalysis with Rflx Culture (collected 17 Oct 2024 01:27)    Culture - Urine (collected 17 Oct 2024 01:27)  Source: Catheterized None  Preliminary Report (18 Oct 2024 07:43):    50,000 - 99,000 CFU/mL Gram Negative Rods    <10,000 CFU/ml Normal Urogenital sylvain present    Culture - Blood (collected 17 Oct 2024 00:10)  Source: .Blood BLOOD  Preliminary Report (18 Oct 2024 07:01):    No growth at 24 hours    Culture - Blood (collected 17 Oct 2024 00:10)  Source: .Blood BLOOD  Preliminary Report (18 Oct 2024 07:01):    No growth at 24 hours      IMAGING
Patient is a 89y old  Female who presents with a chief complaint of Sepsis (10-18-24)      Pt seen and examined at bedside. Unable to get ROS.       PAST MEDICAL & SURGICAL HISTORY:  Diabetes    Dementia    Hypertension    Schizophrenia    No significant past surgical history        VITAL SIGNS (Last 24 hrs):  T(C): 36.4 (10-19-24 @ 12:29), Max: 37.4 (10-19-24 @ 05:39)  HR: 100 (10-19-24 @ 12:29) (94 - 109)  BP: 121/59 (10-19-24 @ 12:29) (121/59 - 142/79)  RR: 18 (10-19-24 @ 12:29) (18 - 18)  SpO2: 100% (10-19-24 @ 12:29) (100% - 100%)  Wt(kg): --  Daily     Daily     I&O's Summary    18 Oct 2024 07:01  -  19 Oct 2024 07:00  --------------------------------------------------------  IN: 0 mL / OUT: 375 mL / NET: -375 mL        PHYSICAL EXAM:  GENERAL: NAD  HEAD:  Atraumatic, Normocephalic  EYES: EOMI, PERRLA, conjunctiva and sclera clear  NECK: Supple, No JVD  CHEST/LUNG: Clear to auscultation bilaterally; No wheeze  HEART: Regular rate and rhythm; No murmurs, rubs, or gallops  ABDOMEN: Soft, Nontender, Nondistended; Bowel sounds present  EXTREMITIES:  2+ Peripheral Pulses, No clubbing, cyanosis, or edema  PSYCH: Alert   NEUROLOGY: non-focal  SKIN: No rashes or lesions    Labs Reviewed  Spoke to patient in regards to abnormal labs.    CBC Full  -  ( 19 Oct 2024 04:30 )  WBC Count : 10.68 K/uL  Hemoglobin : 7.9 g/dL  Hematocrit : 26.9 %  Platelet Count - Automated : 206 K/uL  Mean Cell Volume : 73.9 fL  Mean Cell Hemoglobin : 21.7 pg  Mean Cell Hemoglobin Concentration : 29.4 g/dL  Auto Neutrophil # : 7.51 K/uL  Auto Lymphocyte # : 2.20 K/uL  Auto Monocyte # : 0.82 K/uL  Auto Eosinophil # : 0.09 K/uL  Auto Basophil # : 0.01 K/uL  Auto Neutrophil % : 70.3 %  Auto Lymphocyte % : 20.6 %  Auto Monocyte % : 7.7 %  Auto Eosinophil % : 0.8 %  Auto Basophil % : 0.1 %    BMP:    10-19 @ 04:30    Blood Urea Nitrogen - 11  Calcium - 8.8  Carbond Dioxide - 29  Chloride - 104  Creatinine - 0.6  Glucose - 122  Potassium - 4.6  Sodium - 142      Hemoglobin A1c -   PT/INR - ( 17 Oct 2024 00:10 )   PT: 13.00 sec;   INR: 1.14 ratio         PTT - ( 17 Oct 2024 00:10 )  PTT:29.3 sec  Urine Culture:  10-17 @ 01:27 Urine culture: --    Culture Results:   50,000 - 99,000 CFU/mL Gram Negative Rods  <10,000 CFU/ml Normal Urogenital sylvain present  Method Type: --  Organism: --  Organism Identification: --  Specimen Source: Catheterized None  10-17 @ 00:10 Urine culture: --    Culture Results:   No growth at 48 Hours  Method Type: --  Organism: --  Organism Identification: --  Specimen Source: .Blood BLOOD        COVID Labs  CRP:    Procalcitonin: 3.72 ng/mL (10-17-24 @ 11:17)    D-Dimer:  373 ng/mL DDU (10-18-24 @ 11:40)            Imaging reviewed independently and reviewed read  < from: Xray Chest 1 View-PORTABLE IMMEDIATE (Xray Chest 1 View-PORTABLE IMMEDIATE .) (10.18.24 @ 16:23) >  IMPRESSION:    Support devices: None.  Cardiac/mediastinum/hilum: Aortic calcifications.  Lung parenchyma/Pleura: No pneumothorax. Right basilar   opacity/atelectasis. Interstitial prominence/pulmonary vascular   congestion.  Skeleton/soft tissues: Calcific tendinosis in the right shoulder.    < end of copied text >        MEDICATIONS  (STANDING):  benztropine 0.5 milliGRAM(s) Oral daily  carbidopa/levodopa  25/100 0.5 Tablet(s) Oral every 8 hours  cefTRIAXone   IVPB 1000 milliGRAM(s) IV Intermittent every 24 hours  enoxaparin Injectable 40 milliGRAM(s) SubCutaneous every 24 hours  lactated ringers. 1000 milliLiter(s) (75 mL/Hr) IV Continuous <Continuous>  levETIRAcetam  Solution 1000 milliGRAM(s) Oral two times a day    MEDICATIONS  (PRN):  acetaminophen     Tablet .. 650 milliGRAM(s) Oral every 6 hours PRN Temp greater or equal to 38C (100.4F), Moderate Pain (4 - 6)  aluminum hydroxide/magnesium hydroxide/simethicone Suspension 30 milliLiter(s) Oral every 4 hours PRN Dyspepsia  melatonin 3 milliGRAM(s) Oral at bedtime PRN Insomnia  ondansetron Injectable 4 milliGRAM(s) IV Push every 8 hours PRN Nausea and/or Vomiting      
  BERTIN DEL RIO  89y, Female    All available historical data reviewed    OVERNIGHT EVENTS:  no fevers  NAD    ROS:  unable to obtain history secondary to patient's mental status     VITALS:  T(F): 98.7, Max: 98.9 (10-18-24 @ 03:45)  HR: 108  BP: 133/79  RR: 18Vital Signs Last 24 Hrs  T(C): 37.1 (18 Oct 2024 11:59), Max: 37.2 (18 Oct 2024 03:45)  T(F): 98.7 (18 Oct 2024 11:59), Max: 98.9 (18 Oct 2024 03:45)  HR: 108 (18 Oct 2024 11:59) (100 - 108)  BP: 133/79 (18 Oct 2024 11:59) (119/72 - 135/65)  BP(mean): --  RR: 18 (18 Oct 2024 11:59) (18 - 19)  SpO2: 100% (18 Oct 2024 11:59) (91% - 100%)    Parameters below as of 18 Oct 2024 11:59  Patient On (Oxygen Delivery Method): nasal cannula  O2 Flow (L/min): 2      TESTS & MEASUREMENTS:                        8.7    13.45 )-----------( 207      ( 18 Oct 2024 11:40 )             28.9     10-18    140  |  103  |  12  ----------------------------<  142[H]  4.2   |  29  |  0.6[L]    Ca    9.1      18 Oct 2024 11:40  Mg     1.6     10-18    TPro  6.3  /  Alb  3.6  /  TBili  0.3  /  DBili  x   /  AST  12  /  ALT  <5  /  AlkPhos  89  10-18    LIVER FUNCTIONS - ( 18 Oct 2024 11:40 )  Alb: 3.6 g/dL / Pro: 6.3 g/dL / ALK PHOS: 89 U/L / ALT: <5 U/L / AST: 12 U/L / GGT: x             Urinalysis with Rflx Culture (collected 10-17-24 @ 01:27)    Culture - Urine (collected 10-17-24 @ 01:27)  Source: Catheterized None  Preliminary Report (10-18-24 @ 07:43):    50,000 - 99,000 CFU/mL Gram Negative Rods    <10,000 CFU/ml Normal Urogenital sylvain present    Culture - Blood (collected 10-17-24 @ 00:10)  Source: .Blood BLOOD  Preliminary Report (10-18-24 @ 07:01):    No growth at 24 hours    Culture - Blood (collected 10-17-24 @ 00:10)  Source: .Blood BLOOD  Preliminary Report (10-18-24 @ 07:01):    No growth at 24 hours      Urinalysis Basic - ( 18 Oct 2024 11:40 )    Color: x / Appearance: x / SG: x / pH: x  Gluc: 142 mg/dL / Ketone: x  / Bili: x / Urobili: x   Blood: x / Protein: x / Nitrite: x   Leuk Esterase: x / RBC: x / WBC x   Sq Epi: x / Non Sq Epi: x / Bacteria: x          Social History:  Tobacco Use: No  Alcohol Use: No  Drug Use: No    RADIOLOGY & ADDITIONAL TESTS:  Personal review of radiological diagnostics performed  Echo and EKG results noted when applicable.     MEDICATIONS:  acetaminophen     Tablet .. 650 milliGRAM(s) Oral every 6 hours PRN  aluminum hydroxide/magnesium hydroxide/simethicone Suspension 30 milliLiter(s) Oral every 4 hours PRN  benztropine 0.5 milliGRAM(s) Oral daily  carbidopa/levodopa  25/100 0.5 Tablet(s) Oral every 8 hours  cefTRIAXone   IVPB 1000 milliGRAM(s) IV Intermittent every 24 hours  enoxaparin Injectable 40 milliGRAM(s) SubCutaneous every 24 hours  lactated ringers. 1000 milliLiter(s) IV Continuous <Continuous>  levETIRAcetam  Solution 1000 milliGRAM(s) Oral two times a day  melatonin 3 milliGRAM(s) Oral at bedtime PRN  ondansetron Injectable 4 milliGRAM(s) IV Push every 8 hours PRN      ANTIBIOTICS:  cefTRIAXone   IVPB 1000 milliGRAM(s) IV Intermittent every 24 hours

## 2024-10-20 NOTE — DISCHARGE NOTE PROVIDER - HOSPITAL COURSE
89-year-old non verbal female with a past medical history of Parkinson's, dementia, h/o GI bleed, hypertension, hyperlipidemia, history of epilepsy, CVA, and history of schizophrenia coming to the ED from [facility] for evaluation of fever that began today. She apparently was satting low 50s when EMS arrived. Labs : WBC 21K, Hb 9.4, BUn/Cr 24/0.8, VBG lactate 3.5 -->2.6, UA +, RVP neg. CXR : interstitial infiltrates. Admitted to medicine for sepsis 2/2 UTI.     #Septic on admission 2/2 Acute pyelonephritis  2/2 pansensative providencia  - No previous cultures   - NO pneumonia on Cxr   - F/U Ucx  -  change antibiotics to CTX 1 grm daily , vantin on DC  - ID consult appreciated  - Trend lactate, trending down  - Pt still spiking fevers - tylenol prn  -  DC IVF   - BCx neg     #sinus tach likely 2/2 sepsis   - on minimal o2, cxr neg   - dimer 300s, duplex neg   - DC IVF   - repeat CXR   - improving     #PD   #Dementia   - C/W sinamet, benztropine     #HTN   #HLD  - C/W home Meds    #CVA   #Epilepsy  #Schizophrenia   - C/W home meds

## 2024-10-20 NOTE — PROGRESS NOTE ADULT - ASSESSMENT
89-year-old non verbal female with a past medical history of Parkinson's, dementia, h/o GI bleed, hypertension, hyperlipidemia, history of epilepsy, CVA, and history of schizophrenia coming to the ED from [facility] for evaluation of fever that began today. She apparently was satting low 50s when EMS arrived. Labs : WBC 21K, Hb 9.4, BUn/Cr 24/0.8, VBG lactate 3.5 -->2.6, UA +, RVP neg. CXR : interstitial infiltrates. Admitted to medicine for sepsis 2/2 UTI.     #Septic on admission 2/2 Acute pyelonephritis   - No previous cultures   - NO pneumonia on Cxr   - F/U Ucx  -  change antibiotics to CTX 1 grm daily , follow up cultures  - ID consult appreciated c/w rocephin for now  - Trend lactate, trending down  - Pt still spiking fevers - tylenol prn  - s/p IVF  - F/U procal and blood cultures    #sinus tach likely 2/2 sepsis   - on minimal o2, cxr neg   - dimer 373, duplex neg   - repeat CXR   - improving    #PD   #Dementia   - C/W sinamet, benztropine     #HTN   #HLD  - C/W home Meds    #CVA   #Epilepsy  #Schizophrenia   - C/W home meds     #MISC   - Diet - diet per S/S - pureed  - DVT - Lovenox  - GI prophy - PPI  - Activity - wheelchair bound

## 2024-10-20 NOTE — DISCHARGE NOTE PROVIDER - NSDCMRMEDTOKEN_GEN_ALL_CORE_FT
acetaminophen 325 mg oral tablet: 2 tab(s) orally every 4 hours  atorvastatin 10 mg oral tablet: 1 tab(s) orally once a day  benztropine 0.5 mg oral tablet: 1 tab(s) orally once a day  Calcium 500+D oral tablet, chewable: 1 tab(s) orally 2 times a day  carbidopa-levodopa 25 mg-100 mg oral tablet: 0.5 tab(s) orally every 8 hours  cefpodoxime 200 mg oral tablet: 1 tab(s) orally 2 times a day last dose 10/24  Lantus 100 units/mL subcutaneous solution:   Lantus 100 units/mL subcutaneous solution: 12 international unit(s) subcutaneous once a day (at bedtime)  levETIRAcetam 100 mg/mL oral solution: 10 milliliter(s) orally 2 times a day

## 2024-10-20 NOTE — DISCHARGE NOTE PROVIDER - NSDCCPTREATMENT_GEN_ALL_CORE_FT
PRINCIPAL PROCEDURE  Procedure: Culture urine  Findings and Treatment: Culture - Urine (10.17.24 @ 01:27)    -  Amoxicillin/Clavulanic Acid: R <=8/4    -  Ampicillin: R <=8 These ampicillin results predict results for amoxicillin    -  Ampicillin/Sulbactam: S <=4/2    -  Aztreonam: S <=4    -  Cefazolin: R <=2    -  Cefepime: S <=2    -  Cefoxitin: S <=8    -  Ceftriaxone: S <=1    -  Ciprofloxacin: R 2    -  Ertapenem: S <=0.5    -  Levofloxacin: R 4    -  Meropenem: S <=1    -  Nitrofurantoin: R >64 Should not be used to treat pyelonephritis    -  Piperacillin/Tazobactam: S <=8    -  Trimethoprim/Sulfamethoxazole: S <=0.5/9.5    Specimen Source: Catheterized None    Culture Results:   50,000 - 99,000 CFU/mL Providencia stuartii  <10,000 CFU/ml Normal Urogenital sylvain present    Organism Identification: Providencia stuartii    Organism: Providencia stuartii    Method Type: GROVER

## 2024-10-20 NOTE — PROGRESS NOTE ADULT - TIME BILLING
depth regular/pattern regular/unlabored/no shortness of breath/rate regular
time spent on review of labs, imaging studies, old records, obtaining history, personally examining patient, counselling and communicating with patient, entering orders for medications/tests/etc, discussions with other health care providers, documentation in electronic health records, independent interpretation of labs, imaging/procedure results and care coordination.
I have personally seen and examined this patient. I have reviewed all pertinent clinical information and reviewed all relevant imaging ( and noted the impression from the Radiologist ) and diagnostic studies personally. I counseled the patient about the diagnostic testing and treatment plan. I discussed my recommendations with the primary team.

## 2024-10-20 NOTE — PROGRESS NOTE ADULT - ASSESSMENT
89-year-old non verbal female with a past medical history of Parkinson's, dementia, h/o GI bleed, hypertension, hyperlipidemia, history of epilepsy, CVA, and history of schizophrenia coming to the ED from [facility] for evaluation of fever that began today. She apparently was satting low 50s when EMS arrived. Labs : WBC 21K, Hb 9.4, BUn/Cr 24/0.8, VBG lactate 3.5 -->2.6, UA +, RVP neg. CXR : interstitial infiltrates. Admitted to medicine for sepsis 2/2 UTI.     #Septic on admission 2/2 Acute pyelonephritis  2/2 pansensative providencia  - No previous cultures   - NO pneumonia on Cxr   - F/U Ucx  -  change antibiotics to CTX 1 grm daily , vantin on DC  - ID consult appreciated  - Trend lactate, trending down  - Pt still spiking fevers - tylenol prn  -  DC IVF   - F/U procal and blood cultures    #sinus tach likely 2/2 sepsis   - on minimal o2, cxr neg   - dimer 300s, duplex neg   - DC IVF   - repeat CXR   - improving     #PD   #Dementia   - C/W sinamet, benztropine     #HTN   #HLD  - C/W home Meds    #CVA   #Epilepsy  #Schizophrenia   - C/W home meds     #Progress Note Handoff  Pending (specify):  follow up cultures   Family discussion: house staff updated pt family  Disposition: SNF 10/21  Decision to admit the pt is based on acuity as above

## 2024-10-20 NOTE — DISCHARGE NOTE PROVIDER - INSTRUCTIONS
Diet, Pureed:   Supplement Feeding Modality:  Oral  Ensure Plus High Protein Cans or Servings Per Day:  1       Frequency:  Two Times a day

## 2024-10-22 LAB
CULTURE RESULTS: SIGNIFICANT CHANGE UP
CULTURE RESULTS: SIGNIFICANT CHANGE UP
SPECIMEN SOURCE: SIGNIFICANT CHANGE UP
SPECIMEN SOURCE: SIGNIFICANT CHANGE UP

## 2024-10-29 DIAGNOSIS — E11.9 TYPE 2 DIABETES MELLITUS WITHOUT COMPLICATIONS: ICD-10-CM

## 2024-10-29 DIAGNOSIS — A41.50 GRAM-NEGATIVE SEPSIS, UNSPECIFIED: ICD-10-CM

## 2024-10-29 DIAGNOSIS — G40.909 EPILEPSY, UNSPECIFIED, NOT INTRACTABLE, WITHOUT STATUS EPILEPTICUS: ICD-10-CM

## 2024-10-29 DIAGNOSIS — Z99.3 DEPENDENCE ON WHEELCHAIR: ICD-10-CM

## 2024-10-29 DIAGNOSIS — Z79.899 OTHER LONG TERM (CURRENT) DRUG THERAPY: ICD-10-CM

## 2024-10-29 DIAGNOSIS — I10 ESSENTIAL (PRIMARY) HYPERTENSION: ICD-10-CM

## 2024-10-29 DIAGNOSIS — G20.C PARKINSONISM, UNSPECIFIED: ICD-10-CM

## 2024-10-29 DIAGNOSIS — F20.9 SCHIZOPHRENIA, UNSPECIFIED: ICD-10-CM

## 2024-10-29 DIAGNOSIS — E78.5 HYPERLIPIDEMIA, UNSPECIFIED: ICD-10-CM

## 2024-10-29 DIAGNOSIS — Z86.73 PERSONAL HISTORY OF TRANSIENT ISCHEMIC ATTACK (TIA), AND CEREBRAL INFARCTION WITHOUT RESIDUAL DEFICITS: ICD-10-CM

## 2024-10-29 DIAGNOSIS — F02.80 DEMENTIA IN OTHER DISEASES CLASSIFIED ELSEWHERE, UNSPECIFIED SEVERITY, WITHOUT BEHAVIORAL DISTURBANCE, PSYCHOTIC DISTURBANCE, MOOD DISTURBANCE, AND ANXIETY: ICD-10-CM

## 2024-10-29 DIAGNOSIS — N10 ACUTE PYELONEPHRITIS: ICD-10-CM

## 2024-10-29 DIAGNOSIS — Z79.4 LONG TERM (CURRENT) USE OF INSULIN: ICD-10-CM
